# Patient Record
Sex: MALE | Race: BLACK OR AFRICAN AMERICAN | NOT HISPANIC OR LATINO | Employment: FULL TIME | URBAN - METROPOLITAN AREA
[De-identification: names, ages, dates, MRNs, and addresses within clinical notes are randomized per-mention and may not be internally consistent; named-entity substitution may affect disease eponyms.]

---

## 2023-03-06 ENCOUNTER — CLINICAL SUPPORT (OUTPATIENT)
Dept: PHARMACY | Facility: HOSPITAL | Age: 58
End: 2023-03-06

## 2023-03-06 DIAGNOSIS — E11.65 TYPE 2 DIABETES MELLITUS WITH HYPERGLYCEMIA, WITHOUT LONG-TERM CURRENT USE OF INSULIN (MULTI): Primary | ICD-10-CM

## 2023-03-06 DIAGNOSIS — E11.9 TYPE 2 DIABETES MELLITUS WITHOUT COMPLICATION, WITHOUT LONG-TERM CURRENT USE OF INSULIN (MULTI): Primary | ICD-10-CM

## 2023-03-06 DIAGNOSIS — E11.65 TYPE 2 DIABETES MELLITUS WITH HYPERGLYCEMIA, WITHOUT LONG-TERM CURRENT USE OF INSULIN (MULTI): ICD-10-CM

## 2023-03-06 RX ORDER — METFORMIN HYDROCHLORIDE 500 MG/1
1000 TABLET ORAL DAILY
COMMUNITY
Start: 2019-09-24 | End: 2023-07-25 | Stop reason: SDUPTHER

## 2023-03-06 RX ORDER — ROSUVASTATIN CALCIUM 20 MG/1
20 TABLET, COATED ORAL DAILY
COMMUNITY
Start: 2019-09-24 | End: 2023-04-17 | Stop reason: SINTOL

## 2023-03-06 RX ORDER — ASPIRIN 81 MG/1
81 TABLET ORAL DAILY
COMMUNITY
Start: 2019-09-24

## 2023-03-06 RX ORDER — BLOOD-GLUCOSE METER
1 KIT MISCELLANEOUS DAILY
COMMUNITY
Start: 2019-09-24 | End: 2023-05-02

## 2023-03-06 ASSESSMENT — ENCOUNTER SYMPTOMS: DIABETIC ASSOCIATED SYMPTOMS: 0

## 2023-03-06 NOTE — PROGRESS NOTES
Iraj Benavides is a 57 y.o. male was referred to Clinical Pharmacy Team for his Diabetes management follow up.    Referring Provider: Marian Rios MD    Subjective   Allergies   Allergen Reactions    Penicillins Unknown       [facility]  [address]  [facility] [address]  Phone: [phone] Fax: [phone]      Diabetes  He presents for his follow-up diabetic visit. He has type 2 diabetes mellitus. His disease course has been stable. There are no hypoglycemic associated symptoms. There are no diabetic associated symptoms. There are no hypoglycemic complications. Current diabetic treatment includes oral agent (monotherapy). His home blood glucose trend is increasing steadily. His breakfast blood glucose range is generally 140-180 mg/dl. An ACE inhibitor/angiotensin II receptor blocker is not being taken.       Review of Systems    Objective     There were no vitals taken for this visit.     LAB  Lab Results   Component Value Date    BILITOT 0.6 09/02/2022    CALCIUM 10.1 09/02/2022    CO2 27 09/02/2022    CL 99 09/02/2022    CREATININE 1.14 09/02/2022    GLUCOSE 303 (H) 09/02/2022    ALKPHOS 57 09/02/2022    K 4.4 09/02/2022    PROT 7.3 09/02/2022     09/02/2022    AST 14 09/02/2022    ALT 16 09/02/2022    BUN 18 09/02/2022    ANIONGAP 15 09/02/2022    ALBUMIN 4.4 09/02/2022     Lab Results   Component Value Date    TRIG 132 09/02/2022    CHOL 267 (H) 09/02/2022    HDL 43.6 09/02/2022     Lab Results   Component Value Date    HGBA1C 8.9 (A) 09/02/2022       Current Outpatient Medications on File Prior to Visit   Medication Sig Dispense Refill    aspirin 81 mg EC tablet Take 1 tablet (81 mg) by mouth once daily.      FreeStyle Lite Strips strip 1 strip once daily.      metFORMIN (Glucophage) 500 mg tablet Take 2 tablets (1,000 mg) by mouth once daily.      rosuvastatin (Crestor) 20 mg tablet Take 1 tablet (20 mg) by mouth once daily.       No current facility-administered medications on file  prior to visit.        HISTORICAL PHARMACOTHERAPY  - Farxiga 5 mg    DRUG INTERACTIONS  - None    SECONDARY PREVENTION  - Statin? yes  - ACE-I/ARB? no    Assessment/Plan   Problem List Items Addressed This Visit          Endocrine/Metabolic    Type 2 diabetes mellitus without complications (CMS/HCC) - Primary      Iraj has experienced a UTI while taking Farxiga 5 mg and reported that he has stopped taking this medication. He is interested in starting another medication for his diabetes and prefers to take medications by mouth. Rybelsus was discussed and Iraj is agreeable in starting this medication.    Rybelsus Education:     - Counseled patient on Rybelsus MOA, expectations, side effects, duration of therapy, and monitoring parameters.  - Reviewed Rybelsus titration schedule, starting with 3 mg once daily for 4 weeks, then increasing to 7 mg once daily. Patient verbalized understanding.  - Counseled patient on the benefits of GLP-1ra, such as cardiovascular risk reduction, glycemic control, and weight loss potential.  - Advised patient that they may experience improved satiety after meals and portion sizes of meals may be reduced as doses of Rybelsus increase.  - Counseled patient to avoid foods that are fatty/oily as this may precipitate the nausea/GI upset that may occur with new start Rybelsus.      Seema Johnson, PharmJIMI     Verbal consent to manage patient's drug therapy was obtained from the patient. They were informed they may decline to participate or withdraw from participation in pharmacy services at any time.

## 2023-03-13 DIAGNOSIS — E11.9 TYPE 2 DIABETES MELLITUS WITHOUT COMPLICATION, WITHOUT LONG-TERM CURRENT USE OF INSULIN (MULTI): Primary | ICD-10-CM

## 2023-03-14 PROBLEM — H52.13 MYOPIA OF BOTH EYES WITH REGULAR ASTIGMATISM: Status: ACTIVE | Noted: 2023-03-14

## 2023-03-14 PROBLEM — M79.10 MYALGIA: Status: ACTIVE | Noted: 2023-03-14

## 2023-03-14 PROBLEM — E66.9 OBESITY, CLASS I, BMI 30.0-34.9 (SEE ACTUAL BMI): Status: ACTIVE | Noted: 2023-03-14

## 2023-03-14 PROBLEM — H52.13 MYOPIA WITH PRESBYOPIA OF BOTH EYES: Status: ACTIVE | Noted: 2023-03-14

## 2023-03-14 PROBLEM — H52.13 BILATERAL MYOPIA: Status: ACTIVE | Noted: 2023-03-14

## 2023-03-14 PROBLEM — E78.5 HYPERLIPIDEMIA: Status: ACTIVE | Noted: 2023-03-14

## 2023-03-14 PROBLEM — H52.4 MYOPIA WITH PRESBYOPIA OF BOTH EYES: Status: ACTIVE | Noted: 2023-03-14

## 2023-03-14 PROBLEM — E66.811 OBESITY, CLASS I, BMI 30.0-34.9 (SEE ACTUAL BMI): Status: ACTIVE | Noted: 2023-03-14

## 2023-03-14 PROBLEM — D57.3 SICKLE CELL TRAIT (CMS-HCC): Status: ACTIVE | Noted: 2023-03-14

## 2023-03-14 PROBLEM — H52.223 MYOPIA OF BOTH EYES WITH REGULAR ASTIGMATISM: Status: ACTIVE | Noted: 2023-03-14

## 2023-03-14 RX ORDER — DAPAGLIFLOZIN 5 MG/1
5 TABLET, FILM COATED ORAL DAILY
COMMUNITY
End: 2023-03-20 | Stop reason: SINTOL

## 2023-03-20 ENCOUNTER — APPOINTMENT (OUTPATIENT)
Dept: PHARMACY | Facility: HOSPITAL | Age: 58
End: 2023-03-20

## 2023-03-20 ENCOUNTER — TELEMEDICINE (OUTPATIENT)
Dept: PHARMACY | Facility: HOSPITAL | Age: 58
End: 2023-03-20

## 2023-03-20 DIAGNOSIS — E11.9 TYPE 2 DIABETES MELLITUS WITHOUT COMPLICATION, WITHOUT LONG-TERM CURRENT USE OF INSULIN (MULTI): Primary | ICD-10-CM

## 2023-03-20 RX ORDER — ORAL SEMAGLUTIDE 7 MG/1
7 TABLET ORAL DAILY
Qty: 30 TABLET | Refills: 1 | Status: SHIPPED | OUTPATIENT
Start: 2023-04-03 | End: 2023-04-17 | Stop reason: SINTOL

## 2023-03-20 NOTE — PROGRESS NOTES
Subjective   Patient ID: Iraj Benavides is a 57 y.o. male who presents for diabetes management follow up    Referring Provider: Marian Rios MD     Diabetes  He presents for his follow-up diabetic visit. He has type 2 diabetes mellitus. His disease course has been improving. He is compliant with treatment all of the time. His home blood glucose trend is decreasing steadily. His breakfast blood glucose is taken between 8-9 am. His breakfast blood glucose range is generally 130-140 mg/dl. An ACE inhibitor/angiotensin II receptor blocker is not being taken.       Review of Systems   All other systems reviewed and are negative.      Objective     There were no vitals taken for this visit.     Labs  Lab Results   Component Value Date    BILITOT 0.6 09/02/2022    CALCIUM 10.1 09/02/2022    CO2 27 09/02/2022    CL 99 09/02/2022    CREATININE 1.14 09/02/2022    GLUCOSE 303 (H) 09/02/2022    ALKPHOS 57 09/02/2022    K 4.4 09/02/2022    PROT 7.3 09/02/2022     09/02/2022    AST 14 09/02/2022    ALT 16 09/02/2022    BUN 18 09/02/2022    ANIONGAP 15 09/02/2022    ALBUMIN 4.4 09/02/2022    GFRMALE 75 09/02/2022     Lab Results   Component Value Date    TRIG 132 09/02/2022    CHOL 267 (H) 09/02/2022    HDL 43.6 09/02/2022     Lab Results   Component Value Date    HGBA1C 8.9 (A) 09/02/2022       Assessment/Plan        Iraj Benavides states that he is doing well and reports that his fasting blood sugar readings have improved from 140-180 mg/dL to 130-140 mg/dL. He started taking Rybelsus 3 mg by mouth once daily approximately 2 weeks ago and reports no adverse effects. Discussed increasing the dose from 3 mg to 7 mg in 2-3 weeks to further improve glycemic control. Iraj is agreeable with this plan. Plan to follow up in 3-4 weeks to review blood sugar readings on increased dose of Rybelsus.

## 2023-03-20 NOTE — PROGRESS NOTES
I reviewed the progress note and agree with the resident’s findings and plans as written. Case discussed with resident.    Marla Martinez, JimD

## 2023-03-24 LAB
ALANINE AMINOTRANSFERASE (SGPT) (U/L) IN SER/PLAS: 14 U/L (ref 10–52)
ALBUMIN (G/DL) IN SER/PLAS: 4.5 G/DL (ref 3.4–5)
ALBUMIN (MG/L) IN URINE: <7 MG/L
ALBUMIN/CREATININE (UG/MG) IN URINE: NORMAL UG/MG CRT (ref 0–30)
ALKALINE PHOSPHATASE (U/L) IN SER/PLAS: 50 U/L (ref 33–120)
ANION GAP IN SER/PLAS: 12 MMOL/L (ref 10–20)
ASPARTATE AMINOTRANSFERASE (SGOT) (U/L) IN SER/PLAS: 14 U/L (ref 9–39)
BASOPHILS (10*3/UL) IN BLOOD BY AUTOMATED COUNT: 0.04 X10E9/L (ref 0–0.1)
BASOPHILS/100 LEUKOCYTES IN BLOOD BY AUTOMATED COUNT: 0.6 % (ref 0–2)
BILIRUBIN TOTAL (MG/DL) IN SER/PLAS: 0.6 MG/DL (ref 0–1.2)
CALCIUM (MG/DL) IN SER/PLAS: 10 MG/DL (ref 8.6–10.6)
CARBON DIOXIDE, TOTAL (MMOL/L) IN SER/PLAS: 29 MMOL/L (ref 21–32)
CHLORIDE (MMOL/L) IN SER/PLAS: 102 MMOL/L (ref 98–107)
CHOLESTEROL (MG/DL) IN SER/PLAS: 206 MG/DL (ref 0–199)
CHOLESTEROL IN HDL (MG/DL) IN SER/PLAS: 43.9 MG/DL
CHOLESTEROL/HDL RATIO: 4.7
CREATININE (MG/DL) IN SER/PLAS: 1.08 MG/DL (ref 0.5–1.3)
CREATININE (MG/DL) IN URINE: 229 MG/DL (ref 20–370)
EOSINOPHILS (10*3/UL) IN BLOOD BY AUTOMATED COUNT: 0.17 X10E9/L (ref 0–0.7)
EOSINOPHILS/100 LEUKOCYTES IN BLOOD BY AUTOMATED COUNT: 2.6 % (ref 0–6)
ERYTHROCYTE DISTRIBUTION WIDTH (RATIO) BY AUTOMATED COUNT: 12.7 % (ref 11.5–14.5)
ERYTHROCYTE MEAN CORPUSCULAR HEMOGLOBIN CONCENTRATION (G/DL) BY AUTOMATED: 30.9 G/DL (ref 32–36)
ERYTHROCYTE MEAN CORPUSCULAR VOLUME (FL) BY AUTOMATED COUNT: 87 FL (ref 80–100)
ERYTHROCYTES (10*6/UL) IN BLOOD BY AUTOMATED COUNT: 5.42 X10E12/L (ref 4.5–5.9)
ESTIMATED AVERAGE GLUCOSE FOR HBA1C: 131 MG/DL
GFR MALE: 79 ML/MIN/1.73M2
GLUCOSE (MG/DL) IN SER/PLAS: 155 MG/DL (ref 74–99)
HEMATOCRIT (%) IN BLOOD BY AUTOMATED COUNT: 46.9 % (ref 41–52)
HEMOGLOBIN (G/DL) IN BLOOD: 14.5 G/DL (ref 13.5–17.5)
HEMOGLOBIN A1C/HEMOGLOBIN TOTAL IN BLOOD: 6.2 %
IMMATURE GRANULOCYTES/100 LEUKOCYTES IN BLOOD BY AUTOMATED COUNT: 0.5 % (ref 0–0.9)
LDL: 134 MG/DL (ref 0–99)
LEUKOCYTES (10*3/UL) IN BLOOD BY AUTOMATED COUNT: 6.6 X10E9/L (ref 4.4–11.3)
LYMPHOCYTES (10*3/UL) IN BLOOD BY AUTOMATED COUNT: 3.01 X10E9/L (ref 1.2–4.8)
LYMPHOCYTES/100 LEUKOCYTES IN BLOOD BY AUTOMATED COUNT: 45.4 % (ref 13–44)
MONOCYTES (10*3/UL) IN BLOOD BY AUTOMATED COUNT: 0.39 X10E9/L (ref 0.1–1)
MONOCYTES/100 LEUKOCYTES IN BLOOD BY AUTOMATED COUNT: 5.9 % (ref 2–10)
NEUTROPHILS (10*3/UL) IN BLOOD BY AUTOMATED COUNT: 2.99 X10E9/L (ref 1.2–7.7)
NEUTROPHILS/100 LEUKOCYTES IN BLOOD BY AUTOMATED COUNT: 45 % (ref 40–80)
NRBC (PER 100 WBCS) BY AUTOMATED COUNT: 0 /100 WBC (ref 0–0)
PLATELETS (10*3/UL) IN BLOOD AUTOMATED COUNT: 265 X10E9/L (ref 150–450)
POTASSIUM (MMOL/L) IN SER/PLAS: 4.2 MMOL/L (ref 3.5–5.3)
PROTEIN TOTAL: 7.1 G/DL (ref 6.4–8.2)
SODIUM (MMOL/L) IN SER/PLAS: 139 MMOL/L (ref 136–145)
TRIGLYCERIDE (MG/DL) IN SER/PLAS: 142 MG/DL (ref 0–149)
UREA NITROGEN (MG/DL) IN SER/PLAS: 14 MG/DL (ref 6–23)
VLDL: 28 MG/DL (ref 0–40)

## 2023-04-10 ENCOUNTER — APPOINTMENT (OUTPATIENT)
Dept: PRIMARY CARE | Facility: CLINIC | Age: 58
End: 2023-04-10

## 2023-04-17 ENCOUNTER — TELEMEDICINE (OUTPATIENT)
Dept: PHARMACY | Facility: HOSPITAL | Age: 58
End: 2023-04-17

## 2023-04-17 DIAGNOSIS — E11.9 TYPE 2 DIABETES MELLITUS WITHOUT COMPLICATION, WITHOUT LONG-TERM CURRENT USE OF INSULIN (MULTI): Primary | ICD-10-CM

## 2023-04-17 NOTE — PROGRESS NOTES
Subjective   Patient ID: Iraj Benavides is a 57 y.o. male who presents for No chief complaint on file..  Diabetes  He presents for his follow-up diabetic visit. He has type 2 diabetes mellitus. There are no hypoglycemic associated symptoms. He is following a generally healthy diet. (Fasting SMBG 130s-150s) An ACE inhibitor/angiotensin II receptor blocker is not being taken. He does not see a podiatrist.Eye exam is current.     Patient reports he had issues with right-sided thigh muscle cramps and stopped the Rybelsus and rosuvastatin. The cramps are improving but still occurring. Patient does not think he has been on other statins besides a lower dose rosuvastatin in the past.    Patient reports stomach cramping while on Rybelsus. Does not report nausea or vomiting.    Referring Provider: Marian Rios MD     Objective     There were no vitals taken for this visit.     LAB  Lab Results   Component Value Date    BILITOT 0.6 03/24/2023    CALCIUM 10.0 03/24/2023    CO2 29 03/24/2023     03/24/2023    CREATININE 1.08 03/24/2023    GLUCOSE 155 (H) 03/24/2023    ALKPHOS 50 03/24/2023    K 4.2 03/24/2023    PROT 7.1 03/24/2023     03/24/2023    AST 14 03/24/2023    ALT 14 03/24/2023    BUN 14 03/24/2023    ANIONGAP 12 03/24/2023    ALBUMIN 4.5 03/24/2023    GFRMALE 79 03/24/2023     Lab Results   Component Value Date    TRIG 142 03/24/2023    CHOL 206 (H) 03/24/2023    HDL 43.9 03/24/2023     Lab Results   Component Value Date    HGBA1C 6.2 (A) 03/24/2023     Discussion:  -Rybelsus has more GI side effects than muscle effects. It can be hard on the stomach with cramping, nausea/vomiting, feeling full quickly, and changing BM patterns.   -Rosuvastatin can have effects on the muscles. Usually issues occur on both sides of the body, but the muscle effect are typically on large-groups like the quadriceps.   -Your A1c was dramatically reduced from September to March which is fantastic. Keeping up the good work  with your lifestyle will go a long way in helping you to maintain this progress.     Assessment/Plan   Problem List Items Addressed This Visit          Endocrine/Metabolic    Type 2 diabetes mellitus without complications (CMS/HCC) - Primary     Stop rosuvastatin. Can reconsider again at another time  Restart Rybelsus. Start at 3mg for 4 weeks (Call Milana for refill)  Continue great work with improving diet and exercise regimen          Clinical pharmacy follow-up: 5/8/23 at 4pm to assess Rybelsus tolerance    Meera FernandezD Count includes the [facility] Meds PGY1 Resident    Verbal consent to manage patient's drug therapy was obtained from the patient. They were informed they may decline to participate or withdraw from participation in pharmacy services at any time.

## 2023-04-17 NOTE — ASSESSMENT & PLAN NOTE
Stop rosuvastatin. Can reconsider again at another time  Restart Rybelsus. Start at 3mg for 4 weeks (Call [facility] for refill)  Continue great work with improving diet and exercise regimen

## 2023-05-02 DIAGNOSIS — E11.9 TYPE 2 DIABETES MELLITUS WITHOUT COMPLICATION, WITHOUT LONG-TERM CURRENT USE OF INSULIN (MULTI): Primary | ICD-10-CM

## 2023-05-02 RX ORDER — LANCETS 28 GAUGE
EACH MISCELLANEOUS
Qty: 100 EACH | Refills: 3 | Status: SHIPPED | OUTPATIENT
Start: 2023-05-02 | End: 2024-06-10 | Stop reason: SDUPTHER

## 2023-05-02 RX ORDER — BLOOD-GLUCOSE METER
KIT MISCELLANEOUS
Qty: 100 STRIP | Refills: 3 | Status: SHIPPED | OUTPATIENT
Start: 2023-05-02 | End: 2024-06-10 | Stop reason: SDUPTHER

## 2023-05-08 ENCOUNTER — APPOINTMENT (OUTPATIENT)
Dept: PHARMACY | Facility: HOSPITAL | Age: 58
End: 2023-05-08

## 2023-05-25 ENCOUNTER — TELEMEDICINE (OUTPATIENT)
Dept: PHARMACY | Facility: HOSPITAL | Age: 58
End: 2023-05-25

## 2023-05-25 DIAGNOSIS — E11.9 TYPE 2 DIABETES MELLITUS WITHOUT COMPLICATION, WITHOUT LONG-TERM CURRENT USE OF INSULIN (MULTI): ICD-10-CM

## 2023-05-25 ASSESSMENT — ENCOUNTER SYMPTOMS: DIABETIC ASSOCIATED SYMPTOMS: 0

## 2023-05-25 NOTE — PROGRESS NOTES
[facility] Pharmacy Consult  Iraj Benavides is a 57 y.o. male was referred to Clinical Pharmacy Team for a Pharmacy consult.  The patient was referred for their Diabetes.    Referring Provider: Marian Rios MD    Subjective   Allergies   Allergen Reactions    Penicillins Unknown       [facility]  [address]  [facility] [address]  Phone: [phone] Fax: [phone]    [facility] - [place], [place] - [address]  [address]  [address]  [facility] [address]  Phone: [phone] Fax: [phone]    [facility] - [place], [place] - [address]  [address] [facility] [address]  Phone: [phone] Fax: [phone]      Diabetes  He has type 2 diabetes mellitus. His disease course has been stable. There are no hypoglycemic associated symptoms. There are no diabetic associated symptoms. There are no hypoglycemic complications. Symptoms are stable. There are no diabetic complications. Risk factors for coronary artery disease include diabetes mellitus. He is compliant with treatment all of the time. His breakfast blood glucose is taken between 7-8 am. His breakfast blood glucose range is generally 140-180 mg/dl.     Pt reported the following glucose trends:  5/25 - 165 mg/dL  5/24 - 180 mg/dL  5/23 - 155 mg/dL  5/22 - 170 mg/dL  Average: 167.5 mg/dL    Reported no side effects or intolerance of Rybelsus. Reported he is still taking the 7mg dose and was not aware he was to go back down to 3mg from last visit. Back pain still lingering but not bothersome.    Review of Systems    Objective     There were no vitals taken for this visit.     LAB  Lab Results   Component Value Date    BILITOT 0.6 03/24/2023    CALCIUM 10.0 03/24/2023    CO2 29 03/24/2023     03/24/2023    CREATININE 1.08 03/24/2023    GLUCOSE 155 (H) 03/24/2023    ALKPHOS 50 03/24/2023    K 4.2 03/24/2023    PROT 7.1 03/24/2023     03/24/2023    AST 14 03/24/2023    ALT 14 03/24/2023     BUN 14 03/24/2023    ANIONGAP 12 03/24/2023    ALBUMIN 4.5 03/24/2023    GFRMALE 79 03/24/2023     Lab Results   Component Value Date    TRIG 142 03/24/2023    CHOL 206 (H) 03/24/2023    HDL 43.9 03/24/2023     Lab Results   Component Value Date    HGBA1C 6.2 (A) 03/24/2023       Current Outpatient Medications on File Prior to Visit   Medication Sig Dispense Refill    aspirin 81 mg EC tablet Take 1 tablet (81 mg) by mouth once daily.      FreeStyle Lancets 28 gauge CHECK BLOOD SUGAR ONCE DAILY 100 each 3    FreeStyle Lite Strips strip TEST ONCE DAILY 100 strip 3    metFORMIN (Glucophage) 500 mg tablet Take 2 tablets (1,000 mg) by mouth once daily.      semaglutide (Rybelsus) 3 mg tablet tablet Take 1 tablet (3 mg) by mouth once daily. 30 tablet 1     No current facility-administered medications on file prior to visit.        Assessment/Plan   Problem List Items Addressed This Visit          Endocrine/Metabolic    Type 2 diabetes mellitus without complications (CMS/HCC)     Continue Rybelsus 7mg for 4 more weeks then reassess to transition to 14 mg.  Prescription for Rybelsus 7mg daily sent to patient's preferred  pharmacy  Continue metformin  Continue to take down his blood sugar  FUV in 4 weeks             Continue all meds under the continuation of care with the referring provider and clinical pharmacy team.    Cristobal Perdue PharmD     Verbal consent to manage patient's drug therapy was obtained from [the patient and/or an individual authorized to act on behalf of a patient]. They were informed they may decline to participate or withdraw from participation in pharmacy services at any time.

## 2023-05-25 NOTE — ASSESSMENT & PLAN NOTE
Continue Rybelsus 7mg for 4 more weeks then reassess to transition to 14 mg.  Prescription for Rybelsus 7mg daily sent to patient's preferred  pharmacy  Continue metformin  Continue to take down his blood sugar  FUV in 4 weeks

## 2023-05-26 RX ORDER — ORAL SEMAGLUTIDE 7 MG/1
7 TABLET ORAL DAILY
Qty: 30 TABLET | Refills: 3 | Status: SHIPPED | OUTPATIENT
Start: 2023-05-26 | End: 2023-06-23 | Stop reason: DRUGHIGH

## 2023-06-22 ENCOUNTER — APPOINTMENT (OUTPATIENT)
Dept: PHARMACY | Facility: HOSPITAL | Age: 58
End: 2023-06-22

## 2023-06-23 ENCOUNTER — TELEMEDICINE (OUTPATIENT)
Dept: PHARMACY | Facility: HOSPITAL | Age: 58
End: 2023-06-23

## 2023-06-23 DIAGNOSIS — E11.9 TYPE 2 DIABETES MELLITUS WITHOUT COMPLICATION, WITHOUT LONG-TERM CURRENT USE OF INSULIN (MULTI): Primary | ICD-10-CM

## 2023-06-23 ASSESSMENT — ENCOUNTER SYMPTOMS: DIABETIC ASSOCIATED SYMPTOMS: 0

## 2023-06-23 NOTE — ASSESSMENT & PLAN NOTE
FBG remains elevated. No side effects. Agreeable to increase.     INCREASE to Rybelsus 14 mg - 1 tablet daily   CONTINUE:   Metformin 500 mg - 2 tablets daily

## 2023-06-23 NOTE — PROGRESS NOTES
[facility] Pharmacy Consult  Iraj Benavides is a 57 y.o. male was referred to Clinical Pharmacy Team for a Pharmacy consult.  The patient was referred for their Diabetes.    Referring Provider: Marian Rios MD    Subjective   Allergies   Allergen Reactions    Penicillins Unknown       [facility]  [address]  [facility] [address]  Phone: [phone] Fax: [phone]    [facility] - [place], [place] - [address]  [address]  [address]  [facility] [address]  Phone: [phone] Fax: [phone]    [facility] - [place], [place] - [address] [facility]  [address] [facility] [address]  Phone: [phone] Fax: [phone]      Diabetes  He has type 2 diabetes mellitus. His disease course has been stable. There are no hypoglycemic associated symptoms. There are no diabetic associated symptoms. There are no hypoglycemic complications. Symptoms are stable. There are no diabetic complications. Risk factors for coronary artery disease include diabetes mellitus. He is compliant with treatment all of the time.     Reported no side effects or intolerance of Rybelsus.     Review of Systems    Objective     There were no vitals taken for this visit.     LAB  Lab Results   Component Value Date    BILITOT 0.6 03/24/2023    CALCIUM 10.0 03/24/2023    CO2 29 03/24/2023     03/24/2023    CREATININE 1.08 03/24/2023    GLUCOSE 155 (H) 03/24/2023    ALKPHOS 50 03/24/2023    K 4.2 03/24/2023    PROT 7.1 03/24/2023     03/24/2023    AST 14 03/24/2023    ALT 14 03/24/2023    BUN 14 03/24/2023    ANIONGAP 12 03/24/2023    ALBUMIN 4.5 03/24/2023    GFRMALE 79 03/24/2023     Lab Results   Component Value Date    TRIG 142 03/24/2023    CHOL 206 (H) 03/24/2023    HDL 43.9 03/24/2023     Lab Results   Component Value Date    HGBA1C 6.2 (A) 03/24/2023       Current Outpatient Medications on File Prior to Visit   Medication Sig Dispense Refill    aspirin 81 mg EC tablet Take 1  tablet (81 mg) by mouth once daily.      FreeStyle Lancets 28 gauge CHECK BLOOD SUGAR ONCE DAILY 100 each 3    FreeStyle Lite Strips strip TEST ONCE DAILY 100 strip 3    metFORMIN (Glucophage) 500 mg tablet Take 2 tablets (1,000 mg) by mouth once daily.      [DISCONTINUED] semaglutide (Rybelsus) 7 mg tablet Take 7 mg by mouth once daily. 30 tablet 3     No current facility-administered medications on file prior to visit.        Assessment/Plan   Problem List Items Addressed This Visit       Type 2 diabetes mellitus without complications (CMS/[facility]) - Primary     FBG remains elevated. No side effects. Agreeable to increase.     INCREASE to Rybelsus 14 mg - 1 tablet daily   CONTINUE:   Metformin 500 mg - 2 tablets daily              Follow-up: 4 weeks     Marla Martinez, PharmD     Continue all meds under the continuation of care with the referring provider and clinical pharmacy team.

## 2023-07-25 ENCOUNTER — TELEMEDICINE (OUTPATIENT)
Dept: PHARMACY | Facility: HOSPITAL | Age: 58
End: 2023-07-25
Payer: COMMERCIAL

## 2023-07-25 DIAGNOSIS — E11.9 TYPE 2 DIABETES MELLITUS WITHOUT COMPLICATION, WITHOUT LONG-TERM CURRENT USE OF INSULIN (MULTI): ICD-10-CM

## 2023-07-25 RX ORDER — METFORMIN HYDROCHLORIDE 1000 MG/1
TABLET ORAL
Qty: 45 TABLET | Refills: 3 | Status: SHIPPED | OUTPATIENT
Start: 2023-07-25 | End: 2023-08-17 | Stop reason: SDUPTHER

## 2023-07-25 NOTE — ASSESSMENT & PLAN NOTE
INCREASE to Metformin 1000 mg - 1 tablet in the morning and 1/2 tablet in the evening   CONTINUE:   Rybelsus 14 mg- 1 tablet daily

## 2023-07-25 NOTE — PROGRESS NOTES
Subjective     Patient ID: Iraj Benavides is a 57 y.o. male who presents for Diabetes.    Diabetes  He presents for his follow-up diabetic visit. He has type 2 diabetes mellitus. His disease course has been improving. He is compliant with treatment all of the time. He is following a generally healthy diet. He participates in exercise intermittently. His home blood glucose trend is decreasing steadily.     Reports no side effects with max dose Rybelsus    Allergies   Allergen Reactions    Penicillins Unknown       Objective     Current DM Pharmacotherapy:   Rybelsus 14 mg - 1 tablet once daily   Metformin 500 mg - 2 tablets daily     SECONDARY PREVENTION  - Statin? No  - ACE-I/ARB? No  - Aspirin? Yes    Current monitoring regimen:   Patient is using: glucometer    SMBG Readings: FB-160 mg/dL     Any episodes of hypoglycemia? No  Hypoglycemia awareness? Yes    There were no vitals taken for this visit.    Pertinent PMH Review:  - PMH of Pancreatitis: No  - PMH/FH of Medullary Thyroid Cancer: No  - PMH of Retinopathy: No  - PMH of Urinary Tract Infections: No    Lab Review  Lab Results   Component Value Date    BILITOT 0.6 2023    CALCIUM 10.0 2023    CO2 29 2023     2023    CREATININE 1.08 2023    GLUCOSE 155 (H) 2023    ALKPHOS 50 2023    K 4.2 2023    PROT 7.1 2023     2023    AST 14 2023    ALT 14 2023    BUN 14 2023    ANIONGAP 12 2023    ALBUMIN 4.5 2023    GFRMALE 79 2023     Lab Results   Component Value Date    TRIG 142 2023    CHOL 206 (H) 2023    HDL 43.9 2023     Lab Results   Component Value Date    HGBA1C 6.2 (A) 2023     The ASCVD Risk score (Salena FERNANDEZ, et al., 2019) failed to calculate for the following reasons:    The smoking status is invalid      Assessment/Plan     Problem List Items Addressed This Visit       Type 2 diabetes mellitus without complications  (CMS/[facility])     INCREASE to Metformin 1000 mg - 1 tablet in the morning and 1/2 tablet in the evening   CONTINUE:   Rybelsus 14 mg- 1 tablet daily             Type 2 diabetes mellitus, is at goal. At goal per outdated A1C, home BG out of goal range    Follow up: I recommend diabetes care be 2 weeks.    Marla FernandezD [facility]  Clinical Pharmacist     Continue all meds under the continuation of care with the referring provider and clinical pharmacy team

## 2023-08-17 ENCOUNTER — TELEMEDICINE (OUTPATIENT)
Dept: PHARMACY | Facility: HOSPITAL | Age: 58
End: 2023-08-17
Payer: COMMERCIAL

## 2023-08-17 DIAGNOSIS — E11.9 TYPE 2 DIABETES MELLITUS WITHOUT COMPLICATION, WITHOUT LONG-TERM CURRENT USE OF INSULIN (MULTI): ICD-10-CM

## 2023-08-17 NOTE — ASSESSMENT & PLAN NOTE
CONTINUE:   Rybelsus 14 mg - 1 tablet once daily   Metformin 500 mg - 2 tablets daily   Obtain new labs and schedule PCP F/U

## 2023-08-17 NOTE — PROGRESS NOTES
Subjective     Patient ID: Iraj Benavides is a 57 y.o. male who presents for No chief complaint on file..    Diabetes  He presents for his follow-up diabetic visit. He has type 2 diabetes mellitus. His disease course has been improving. He is compliant with treatment all of the time. He is following a generally healthy diet. He participates in exercise intermittently. His home blood glucose trend is decreasing steadily.     Reports no side effects with max dose Rybelsus    Allergies   Allergen Reactions    Penicillins Unknown       Objective     Current DM Pharmacotherapy:   Rybelsus 14 mg - 1 tablet once daily   Metformin 1000 mg - 1 tablets morning and 0.5 tablet in the evening     SECONDARY PREVENTION  - Statin? No  - ACE-I/ARB? No  - Aspirin? Yes    Current monitoring regimen:   Patient is using: glucometer    SMBG Readings: Reports primary well controlled with any elevated numbers attributed to dietary excursions     Any episodes of hypoglycemia? No  Hypoglycemia awareness? Yes    There were no vitals taken for this visit.    Pertinent PMH Review:  - PMH of Pancreatitis: No  - PMH/FH of Medullary Thyroid Cancer: No  - PMH of Retinopathy: No  - PMH of Urinary Tract Infections: No    Lab Review  Lab Results   Component Value Date    BILITOT 0.6 03/24/2023    CALCIUM 10.0 03/24/2023    CO2 29 03/24/2023     03/24/2023    CREATININE 1.08 03/24/2023    GLUCOSE 155 (H) 03/24/2023    ALKPHOS 50 03/24/2023    K 4.2 03/24/2023    PROT 7.1 03/24/2023     03/24/2023    AST 14 03/24/2023    ALT 14 03/24/2023    BUN 14 03/24/2023    ANIONGAP 12 03/24/2023    ALBUMIN 4.5 03/24/2023    GFRMALE 79 03/24/2023     Lab Results   Component Value Date    TRIG 142 03/24/2023    CHOL 206 (H) 03/24/2023    HDL 43.9 03/24/2023     Lab Results   Component Value Date    HGBA1C 6.2 (A) 03/24/2023     The ASCVD Risk score (Salena FERNANDEZ, et al., 2019) failed to calculate for the following reasons:    The smoking status is  invalid      Assessment/Plan     Problem List Items Addressed This Visit       Type 2 diabetes mellitus without complications (CMS/[facility])       Type 2 diabetes mellitus, is at goal. At goal per outdated A1C, home BG out of goal range    Follow up: I recommend diabetes care be 2 weeks.    Marla Martinez PharmD [facility]  Clinical Pharmacist     Continue all meds under the continuation of care with the referring provider and clinical pharmacy team

## 2023-08-18 RX ORDER — METFORMIN HYDROCHLORIDE 1000 MG/1
TABLET ORAL
Qty: 45 TABLET | Refills: 3 | Status: SHIPPED | OUTPATIENT
Start: 2023-08-18 | End: 2023-09-12 | Stop reason: SDUPTHER

## 2023-09-12 ENCOUNTER — LAB (OUTPATIENT)
Dept: LAB | Facility: LAB | Age: 58
End: 2023-09-12
Payer: COMMERCIAL

## 2023-09-12 ENCOUNTER — OFFICE VISIT (OUTPATIENT)
Dept: PRIMARY CARE | Facility: CLINIC | Age: 58
End: 2023-09-12
Payer: COMMERCIAL

## 2023-09-12 VITALS
DIASTOLIC BLOOD PRESSURE: 76 MMHG | SYSTOLIC BLOOD PRESSURE: 106 MMHG | WEIGHT: 179.3 LBS | TEMPERATURE: 97.6 F | RESPIRATION RATE: 14 BRPM | HEIGHT: 68 IN | OXYGEN SATURATION: 99 % | HEART RATE: 68 BPM | BODY MASS INDEX: 27.17 KG/M2

## 2023-09-12 DIAGNOSIS — Z12.12 SCREENING FOR COLORECTAL CANCER: ICD-10-CM

## 2023-09-12 DIAGNOSIS — E11.9 TYPE 2 DIABETES MELLITUS WITHOUT COMPLICATION, WITHOUT LONG-TERM CURRENT USE OF INSULIN (MULTI): Primary | ICD-10-CM

## 2023-09-12 DIAGNOSIS — Z12.5 PROSTATE CANCER SCREENING: ICD-10-CM

## 2023-09-12 DIAGNOSIS — Z12.11 SCREENING FOR COLORECTAL CANCER: ICD-10-CM

## 2023-09-12 DIAGNOSIS — R00.2 HEART PALPITATIONS: ICD-10-CM

## 2023-09-12 DIAGNOSIS — E11.9 TYPE 2 DIABETES MELLITUS WITHOUT COMPLICATION, WITHOUT LONG-TERM CURRENT USE OF INSULIN (MULTI): ICD-10-CM

## 2023-09-12 DIAGNOSIS — Z23 FLU VACCINE NEED: ICD-10-CM

## 2023-09-12 LAB
ALANINE AMINOTRANSFERASE (SGPT) (U/L) IN SER/PLAS: 20 U/L (ref 10–52)
ALBUMIN (G/DL) IN SER/PLAS: 4.7 G/DL (ref 3.4–5)
ALKALINE PHOSPHATASE (U/L) IN SER/PLAS: 58 U/L (ref 33–120)
ANION GAP IN SER/PLAS: 15 MMOL/L (ref 10–20)
ASPARTATE AMINOTRANSFERASE (SGOT) (U/L) IN SER/PLAS: 20 U/L (ref 9–39)
BILIRUBIN TOTAL (MG/DL) IN SER/PLAS: 0.7 MG/DL (ref 0–1.2)
CALCIUM (MG/DL) IN SER/PLAS: 10 MG/DL (ref 8.6–10.6)
CARBON DIOXIDE, TOTAL (MMOL/L) IN SER/PLAS: 27 MMOL/L (ref 21–32)
CHLORIDE (MMOL/L) IN SER/PLAS: 101 MMOL/L (ref 98–107)
CREATININE (MG/DL) IN SER/PLAS: 0.99 MG/DL (ref 0.5–1.3)
ESTIMATED AVERAGE GLUCOSE FOR HBA1C: 166 MG/DL
GFR MALE: 88 ML/MIN/1.73M2
GLUCOSE (MG/DL) IN SER/PLAS: 175 MG/DL (ref 74–99)
HEMOGLOBIN A1C/HEMOGLOBIN TOTAL IN BLOOD: 7.4 %
POTASSIUM (MMOL/L) IN SER/PLAS: 4.8 MMOL/L (ref 3.5–5.3)
PROSTATE SPECIFIC ANTIGEN,SCREEN: 0.47 NG/ML (ref 0–4)
PROTEIN TOTAL: 7.6 G/DL (ref 6.4–8.2)
SODIUM (MMOL/L) IN SER/PLAS: 138 MMOL/L (ref 136–145)
UREA NITROGEN (MG/DL) IN SER/PLAS: 13 MG/DL (ref 6–23)

## 2023-09-12 PROCEDURE — 1036F TOBACCO NON-USER: CPT | Performed by: STUDENT IN AN ORGANIZED HEALTH CARE EDUCATION/TRAINING PROGRAM

## 2023-09-12 PROCEDURE — 3078F DIAST BP <80 MM HG: CPT | Performed by: STUDENT IN AN ORGANIZED HEALTH CARE EDUCATION/TRAINING PROGRAM

## 2023-09-12 PROCEDURE — 83036 HEMOGLOBIN GLYCOSYLATED A1C: CPT

## 2023-09-12 PROCEDURE — 80053 COMPREHEN METABOLIC PANEL: CPT

## 2023-09-12 PROCEDURE — 90686 IIV4 VACC NO PRSV 0.5 ML IM: CPT | Performed by: STUDENT IN AN ORGANIZED HEALTH CARE EDUCATION/TRAINING PROGRAM

## 2023-09-12 PROCEDURE — 36415 COLL VENOUS BLD VENIPUNCTURE: CPT

## 2023-09-12 PROCEDURE — 99213 OFFICE O/P EST LOW 20 MIN: CPT | Performed by: STUDENT IN AN ORGANIZED HEALTH CARE EDUCATION/TRAINING PROGRAM

## 2023-09-12 PROCEDURE — 3074F SYST BP LT 130 MM HG: CPT | Performed by: STUDENT IN AN ORGANIZED HEALTH CARE EDUCATION/TRAINING PROGRAM

## 2023-09-12 PROCEDURE — 3044F HG A1C LEVEL LT 7.0%: CPT | Performed by: STUDENT IN AN ORGANIZED HEALTH CARE EDUCATION/TRAINING PROGRAM

## 2023-09-12 PROCEDURE — 90471 IMMUNIZATION ADMIN: CPT | Performed by: STUDENT IN AN ORGANIZED HEALTH CARE EDUCATION/TRAINING PROGRAM

## 2023-09-12 PROCEDURE — 84153 ASSAY OF PSA TOTAL: CPT

## 2023-09-12 RX ORDER — METFORMIN HYDROCHLORIDE 1000 MG/1
TABLET ORAL
Qty: 45 TABLET | Refills: 3 | Status: SHIPPED | OUTPATIENT
Start: 2023-09-12 | End: 2023-11-14 | Stop reason: SDUPTHER

## 2023-09-12 RX ORDER — ACETAMINOPHEN 500 MG
TABLET ORAL
Qty: 1 KIT | Refills: 0 | Status: SHIPPED
Start: 2023-09-12 | End: 2023-11-14 | Stop reason: ALTCHOICE

## 2023-09-12 RX ORDER — ROSUVASTATIN CALCIUM 20 MG/1
20 TABLET, COATED ORAL DAILY
COMMUNITY
End: 2024-06-10 | Stop reason: WASHOUT

## 2023-09-12 ASSESSMENT — LIFESTYLE VARIABLES: HOW MANY STANDARD DRINKS CONTAINING ALCOHOL DO YOU HAVE ON A TYPICAL DAY: PATIENT DOES NOT DRINK

## 2023-09-12 ASSESSMENT — PATIENT HEALTH QUESTIONNAIRE - PHQ9
SUM OF ALL RESPONSES TO PHQ9 QUESTIONS 1 AND 2: 0
2. FEELING DOWN, DEPRESSED OR HOPELESS: NOT AT ALL
1. LITTLE INTEREST OR PLEASURE IN DOING THINGS: NOT AT ALL

## 2023-09-12 NOTE — PROGRESS NOTES
Subjective   Patient ID: Iraj Benavides is a pleasant 57 y.o. male who presents for Follow-up (Pt is here for pre-dm fuv.).  HPI  He is here for follow-up.  His last HbA1c 6.2.  Compliant with medications and follows up with our clinical pharmacy.    Has had couple episodes of palpitation that happened few months ago.  Reports that the episode lasted only few seconds while he was resting.  Was not exacerbated with strenuous exercise or activities.  Does not experience any chest pain or shortness of breath associated with the palpitations.  Does not have any prior diagnosis of heart disease.  No family history of heart disease or arrhythmia.    Review of Systems   All other systems reviewed and are negative.      Visit Vitals  /76 (Patient Position: Sitting)   Pulse 68   Temp 36.4 °C (97.6 °F)   Resp 14          Objective   Physical Exam  Constitutional:       General: He is not in acute distress.     Appearance: Normal appearance.   HENT:      Head: Normocephalic and atraumatic.   Eyes:      General: No scleral icterus.     Conjunctiva/sclera: Conjunctivae normal.   Cardiovascular:      Rate and Rhythm: Normal rate and regular rhythm.      Heart sounds: Normal heart sounds.   Pulmonary:      Effort: Pulmonary effort is normal.      Breath sounds: Normal breath sounds. No wheezing.   Abdominal:      General: Bowel sounds are normal. There is no distension.      Palpations: Abdomen is soft.      Tenderness: There is no abdominal tenderness.   Musculoskeletal:      Cervical back: Neck supple.      Right lower leg: No edema.      Left lower leg: No edema.   Lymphadenopathy:      Cervical: No cervical adenopathy.   Skin:     General: Skin is warm and dry.   Neurological:      General: No focal deficit present.      Mental Status: He is alert and oriented to person, place, and time.   Psychiatric:         Mood and Affect: Mood normal.         Behavior: Behavior normal.         Assessment/Plan   Problem List Items  Addressed This Visit       Type 2 diabetes mellitus without complications (CMS/[facility]) - Primary    Relevant Medications    metFORMIN (Glucophage) 1,000 mg tablet     Other Visit Diagnoses       Screening for colorectal cancer        Relevant Orders    Colonoscopy Screening    Flu vaccine need        Prostate cancer screening        Relevant Orders    Prostate Spec.Ag,Screen    Heart palpitations        Relevant Medications    blood pressure monitor kit    Other Relevant Orders    Holter or Event Cardiac Monitor

## 2023-09-12 NOTE — PATIENT INSTRUCTIONS
You are due for pneumonia vaccine and influenza vaccine  Continue with current medications.  Blood work before your next visit.  If blood work or imaging were ordered during your visit, all the nonurgent lab results will be discussed with you at your next office visit.  Please arrive 15 minutes before your appointment.   Return to office for your annual physical exam or as needed

## 2023-09-18 ENCOUNTER — TELEPHONE (OUTPATIENT)
Dept: PRIMARY CARE | Facility: CLINIC | Age: 58
End: 2023-09-18
Payer: COMMERCIAL

## 2023-09-18 NOTE — TELEPHONE ENCOUNTER
Spoke with pt. Dr. Rios advised the patient to take 1 Metformin in the morning and one in the evening. The clinical pharmacist will be reaching out to him.

## 2023-09-19 DIAGNOSIS — E11.9 TYPE 2 DIABETES MELLITUS WITHOUT COMPLICATION, WITHOUT LONG-TERM CURRENT USE OF INSULIN (MULTI): Primary | ICD-10-CM

## 2023-10-19 ENCOUNTER — APPOINTMENT (OUTPATIENT)
Dept: PHARMACY | Facility: HOSPITAL | Age: 58
End: 2023-10-19
Payer: COMMERCIAL

## 2023-10-24 DIAGNOSIS — Z12.11 SPECIAL SCREENING FOR MALIGNANT NEOPLASMS, COLON: ICD-10-CM

## 2023-10-24 RX ORDER — POLYETHYLENE GLYCOL 3350, SODIUM SULFATE ANHYDROUS, SODIUM BICARBONATE, SODIUM CHLORIDE, POTASSIUM CHLORIDE 236; 22.74; 6.74; 5.86; 2.97 G/4L; G/4L; G/4L; G/4L; G/4L
POWDER, FOR SOLUTION ORAL
Qty: 4000 ML | Refills: 0 | Status: SHIPPED
Start: 2023-10-24 | End: 2023-11-14 | Stop reason: ALTCHOICE

## 2023-10-29 ENCOUNTER — APPOINTMENT (OUTPATIENT)
Dept: RADIOLOGY | Facility: HOSPITAL | Age: 58
End: 2023-10-29
Payer: COMMERCIAL

## 2023-10-29 ENCOUNTER — HOSPITAL ENCOUNTER (EMERGENCY)
Facility: HOSPITAL | Age: 58
Discharge: HOME | End: 2023-10-29
Attending: STUDENT IN AN ORGANIZED HEALTH CARE EDUCATION/TRAINING PROGRAM
Payer: COMMERCIAL

## 2023-10-29 VITALS
HEIGHT: 67 IN | TEMPERATURE: 97.7 F | SYSTOLIC BLOOD PRESSURE: 107 MMHG | OXYGEN SATURATION: 99 % | RESPIRATION RATE: 16 BRPM | HEART RATE: 74 BPM | DIASTOLIC BLOOD PRESSURE: 71 MMHG | BODY MASS INDEX: 29.03 KG/M2 | WEIGHT: 185 LBS

## 2023-10-29 DIAGNOSIS — M51.36 DEGENERATIVE DISC DISEASE, LUMBAR: Primary | ICD-10-CM

## 2023-10-29 DIAGNOSIS — M54.50 BILATERAL LOW BACK PAIN WITHOUT SCIATICA, UNSPECIFIED CHRONICITY: ICD-10-CM

## 2023-10-29 LAB
ALBUMIN SERPL BCP-MCNC: 4.6 G/DL (ref 3.4–5)
ALP SERPL-CCNC: 57 U/L (ref 33–120)
ALT SERPL W P-5'-P-CCNC: 28 U/L (ref 10–52)
ANION GAP SERPL CALC-SCNC: 12 MMOL/L (ref 10–20)
APPEARANCE UR: CLEAR
AST SERPL W P-5'-P-CCNC: 18 U/L (ref 9–39)
BASOPHILS # BLD AUTO: 0.04 X10*3/UL (ref 0–0.1)
BASOPHILS NFR BLD AUTO: 0.6 %
BILIRUB SERPL-MCNC: 0.6 MG/DL (ref 0–1.2)
BILIRUB UR STRIP.AUTO-MCNC: NEGATIVE MG/DL
BUN SERPL-MCNC: 15 MG/DL (ref 6–23)
CALCIUM SERPL-MCNC: 10.5 MG/DL (ref 8.6–10.6)
CHLORIDE SERPL-SCNC: 103 MMOL/L (ref 98–107)
CO2 SERPL-SCNC: 28 MMOL/L (ref 21–32)
COLOR UR: YELLOW
CREAT SERPL-MCNC: 1.02 MG/DL (ref 0.5–1.3)
EOSINOPHIL # BLD AUTO: 0.06 X10*3/UL (ref 0–0.7)
EOSINOPHIL NFR BLD AUTO: 0.9 %
ERYTHROCYTE [DISTWIDTH] IN BLOOD BY AUTOMATED COUNT: 11.8 % (ref 11.5–14.5)
GFR SERPL CREATININE-BSD FRML MDRD: 85 ML/MIN/1.73M*2
GLUCOSE SERPL-MCNC: 272 MG/DL (ref 74–99)
GLUCOSE UR STRIP.AUTO-MCNC: ABNORMAL MG/DL
HCT VFR BLD AUTO: 46.6 % (ref 41–52)
HGB BLD-MCNC: 15 G/DL (ref 13.5–17.5)
HOLD SPECIMEN: NORMAL
IMM GRANULOCYTES # BLD AUTO: 0.02 X10*3/UL (ref 0–0.7)
IMM GRANULOCYTES NFR BLD AUTO: 0.3 % (ref 0–0.9)
KETONES UR STRIP.AUTO-MCNC: NEGATIVE MG/DL
LEUKOCYTE ESTERASE UR QL STRIP.AUTO: NEGATIVE
LIPASE SERPL-CCNC: 63 U/L (ref 9–82)
LYMPHOCYTES # BLD AUTO: 2.4 X10*3/UL (ref 1.2–4.8)
LYMPHOCYTES NFR BLD AUTO: 34.9 %
MCH RBC QN AUTO: 26.9 PG (ref 26–34)
MCHC RBC AUTO-ENTMCNC: 32.2 G/DL (ref 32–36)
MCV RBC AUTO: 84 FL (ref 80–100)
MONOCYTES # BLD AUTO: 0.31 X10*3/UL (ref 0.1–1)
MONOCYTES NFR BLD AUTO: 4.5 %
NEUTROPHILS # BLD AUTO: 4.04 X10*3/UL (ref 1.2–7.7)
NEUTROPHILS NFR BLD AUTO: 58.8 %
NITRITE UR QL STRIP.AUTO: NEGATIVE
NRBC BLD-RTO: 0 /100 WBCS (ref 0–0)
PH UR STRIP.AUTO: 5 [PH]
PLATELET # BLD AUTO: 283 X10*3/UL (ref 150–450)
PMV BLD AUTO: 10.8 FL (ref 7.5–11.5)
POTASSIUM SERPL-SCNC: 4.4 MMOL/L (ref 3.5–5.3)
PROT SERPL-MCNC: 7.7 G/DL (ref 6.4–8.2)
PROT UR STRIP.AUTO-MCNC: NEGATIVE MG/DL
RBC # BLD AUTO: 5.57 X10*6/UL (ref 4.5–5.9)
RBC # UR STRIP.AUTO: NEGATIVE /UL
SODIUM SERPL-SCNC: 139 MMOL/L (ref 136–145)
SP GR UR STRIP.AUTO: 1.03
UROBILINOGEN UR STRIP.AUTO-MCNC: <2 MG/DL
WBC # BLD AUTO: 6.9 X10*3/UL (ref 4.4–11.3)

## 2023-10-29 PROCEDURE — 74176 CT ABD & PELVIS W/O CONTRAST: CPT

## 2023-10-29 PROCEDURE — 36415 COLL VENOUS BLD VENIPUNCTURE: CPT | Performed by: NURSE PRACTITIONER

## 2023-10-29 PROCEDURE — 96361 HYDRATE IV INFUSION ADD-ON: CPT

## 2023-10-29 PROCEDURE — 85025 COMPLETE CBC W/AUTO DIFF WBC: CPT | Performed by: NURSE PRACTITIONER

## 2023-10-29 PROCEDURE — 96374 THER/PROPH/DIAG INJ IV PUSH: CPT

## 2023-10-29 PROCEDURE — 81003 URINALYSIS AUTO W/O SCOPE: CPT | Performed by: NURSE PRACTITIONER

## 2023-10-29 PROCEDURE — 99285 EMERGENCY DEPT VISIT HI MDM: CPT | Performed by: NURSE PRACTITIONER

## 2023-10-29 PROCEDURE — 99284 EMERGENCY DEPT VISIT MOD MDM: CPT | Mod: 25 | Performed by: STUDENT IN AN ORGANIZED HEALTH CARE EDUCATION/TRAINING PROGRAM

## 2023-10-29 PROCEDURE — 2500000004 HC RX 250 GENERAL PHARMACY W/ HCPCS (ALT 636 FOR OP/ED): Performed by: NURSE PRACTITIONER

## 2023-10-29 PROCEDURE — 74176 CT ABD & PELVIS W/O CONTRAST: CPT | Performed by: STUDENT IN AN ORGANIZED HEALTH CARE EDUCATION/TRAINING PROGRAM

## 2023-10-29 PROCEDURE — 83690 ASSAY OF LIPASE: CPT | Performed by: NURSE PRACTITIONER

## 2023-10-29 PROCEDURE — 80053 COMPREHEN METABOLIC PANEL: CPT | Performed by: NURSE PRACTITIONER

## 2023-10-29 RX ORDER — NAPROXEN 500 MG/1
500 TABLET ORAL 2 TIMES DAILY PRN
Qty: 20 TABLET | Refills: 0 | Status: SHIPPED | OUTPATIENT
Start: 2023-10-29 | End: 2024-03-01 | Stop reason: WASHOUT

## 2023-10-29 RX ORDER — KETOROLAC TROMETHAMINE 30 MG/ML
30 INJECTION, SOLUTION INTRAMUSCULAR; INTRAVENOUS ONCE
Status: COMPLETED | OUTPATIENT
Start: 2023-10-29 | End: 2023-10-29

## 2023-10-29 RX ADMIN — SODIUM CHLORIDE 1000 ML: 0.9 INJECTION, SOLUTION INTRAVENOUS at 12:30

## 2023-10-29 RX ADMIN — KETOROLAC TROMETHAMINE 30 MG: 30 INJECTION, SOLUTION INTRAMUSCULAR at 12:30

## 2023-10-29 ASSESSMENT — PAIN DESCRIPTION - PAIN TYPE: TYPE: ACUTE PAIN

## 2023-10-29 ASSESSMENT — PAIN DESCRIPTION - DESCRIPTORS: DESCRIPTORS: SHARP

## 2023-10-29 ASSESSMENT — PAIN SCALES - GENERAL: PAINLEVEL_OUTOF10: 8

## 2023-10-29 ASSESSMENT — PAIN - FUNCTIONAL ASSESSMENT: PAIN_FUNCTIONAL_ASSESSMENT: 0-10

## 2023-10-29 NOTE — ED PROVIDER NOTES
HPI   Chief Complaint   Patient presents with    Back Pain     Lower back Pain , start Yesterday. No urinary problem        This is a 56yo male who self reports a PMH of NIDDM presenting to the ER for lower back pain since last night. Pt reports constant, pressure-like pain to his lower back/bilateral flank region without a specific pattern. He denies injury. No fever or chills. No nausea, vomiting, or diarrhea. He mentions last night that he had a dull ache (localizing to his mid abdomen) that he took Tums for (does not take regularly). He has no abdominal pain now. Last BM this morning and normal. No urinary complaints. No history of kidney stones. No self tx PTA. ROS otherwise negative.    PMH/PSH reviewed    General: No apparent distress, answers questions appropriately   HEENT: Normocephalic atraumatic, sclera white. Nose patent bilaterally. Mucous membranes pink and moist.   Neck: Trachea midline. No swelling or tenderness.  Lungs: Clear to auscultation bilaterally. No use of accessory muscles.  Heart: Regular rate and rhythm. No obvious murmur.  Abdomen: Soft, nontender bowel sounds present. No rebound tenderness. No CVA tenderness.  Back: No midline spinal tenderness. No evidence of spinal trauma or infection.  Extremities: No clubbing, cyanosis or edema.   Neuro: No motor/sensory or focal deficits.   Psych: Normal affect. Pt calm and cooperative.  Skin: No acute rash.    ED course: This patient's case was staffed with Dr. Montana who was involved with the decision-making and plan of care. See attending note for further details. At bedside for abdominal US - aorta/vasculature appears normal/unremarkable  Pt hydrated with IV NS and medicated with Toradol for pain.  See lab results and diagnostic reports. WBC normal Glucose 272 urine without ketones or blood  CT:     1. No acute abnormality of the abdomen or pelvis.  2. Moderate multilevel degenerative changes of the lower lumbar  spine, most pronounced at  L5-S1, notable for high-grade bilateral  foraminal stenosis (right > left).  3. Colonic diverticulosis without evidence of diverticulitis.    Pt afebrile, VSS in NAD. On reexam, pain improved after Toradol. S/s felt to be likely musculoskeletal in nature. Low suspicion for acute emergent process today.    At this point, the patient will be discharged from the emergency department. He is in stable condition and in agreement with treatment plan.    A whole food diet (low in processed foods/excess sugar and high in plant fiber) was recommended.     Assessment/Plan:  #1 Lower back pain - naproxen prn     Follow-up with a primary medical doctor in 3-5 days. Return to the emergency department with any new concerns or if condition worsens.  *Please note that portions of this note may have been completed with a voice recognition program.  Efforts were made to edit the dictations but occasionally, words are mis-transcribed.                          No data recorded                Patient History   Past Medical History:   Diagnosis Date    Body mass index (BMI) 29.0-29.9, adult 05/21/2020    Body mass index (BMI) of 29.0 to 29.9 in adult    Personal history of other diseases of the circulatory system 05/20/2021    History of rheumatic fever    Prediabetes     Prediabetes     Past Surgical History:   Procedure Laterality Date    OTHER SURGICAL HISTORY  09/24/2019    Complete colonoscopy     Family History   Problem Relation Name Age of Onset    Coronary artery disease Mother      Hyperlipidemia Mother      Other (malignant neoplasm) Mother      Diabetes type II Mother      Other (malignant neoplasm) Father       Social History     Tobacco Use    Smoking status: Never    Smokeless tobacco: Never   Substance Use Topics    Alcohol use: Never    Drug use: Never       Physical Exam   ED Triage Vitals [10/29/23 1102]   Temp Heart Rate Resp BP   36.5 °C (97.7 °F) 74 16 107/71      SpO2 Temp src Heart Rate Source Patient Position   99  % -- Monitor --      BP Location FiO2 (%)     Right arm --       Physical Exam    ED Course & MDM   Diagnoses as of 10/29/23 1825   Bilateral low back pain without sciatica, unspecified chronicity   Degenerative disc disease, lumbar       Medical Decision Making      Procedure  Procedures    Attestation of Taco Fletcher MD    This patient was seen, evaluated, treated, and dispositioned by the advanced practice provider. I performed an independent history and physical examination, discussed and reviewed pertinent aspects of the case, care, and management with the advanced practice provider, was physically available to the advanced practice provider for questions and consultation at the time the patient was being evaluated in the Emergency Department, and agree with the general content of the advanced practice provider's note, findings, and plan of care.     In summary, the patient is a 57-year-old man with history of early sepsis type 2 diabetes but no other major medical problems and sees doctors regularly and does not use any tobacco regularly who presents with 1 day of atraumatic back pain.  The pain is constant not made worse by anything in think of and there is no ripping or tearing numbness tingling or weakness or radiation down his legs.  No fevers chills IV drug use perianal anesthesia, perineal anesthesia or hematuria dysuria.     PHYSICAL EXAM:  VITAL SIGNS: Nursing notes reviewed.  GENERAL:  Alert and interactive  EYES:   Eyes track. No injection.  ENT:  Airway patent. Mucus membranes moist.  RESPIRATORY:  Nonlabored breathing. Chest rise symmetric  CARDIOVASCULAR:  [Regular rate.] [Regular rhythm.]  GASTROINTESTINAL:  No distension.  MUSCULOSKELETAL:  No deformity.  No swelling.  NEUROLOGICAL:  Awake. Moves extremities  SKIN:  Warm. Dry.    Pain does not seem colicky so we did do a bedside ultrasound to look for signs of enlarged aorta and the aortic caliber was normal which is reassuring especially  given he has no risk factors other than age.  While not classic of kidney stones we are concerned that that was causing his presentation so he sent him for dry CT of the abdomen pelvis.  His abdomen is otherwise benign.  Blood work was obtained.  No chest pain shortness of breath or other symptoms point towards cardiopulmonary pathology.  No midline tenderness to point towards lumbar spine or vertebral pathology and he has no rashes to point towards shingles.  We treated his pain here.  If his work-up is reassuring and I believe he safe for discharge we did discuss the diagnostic uncertainty and recommended he return if his symptoms should worsen in any way and otherwise follow-up with his regular doctor.  Please refer to the advanced practice provider's note for further details of this case.        Ligia Wylie, APRN-CNP  10/29/23 2911

## 2023-10-30 ENCOUNTER — APPOINTMENT (OUTPATIENT)
Dept: GASTROENTEROLOGY | Facility: EXTERNAL LOCATION | Age: 58
End: 2023-10-30
Payer: COMMERCIAL

## 2023-11-07 ENCOUNTER — OFFICE VISIT (OUTPATIENT)
Dept: GASTROENTEROLOGY | Facility: EXTERNAL LOCATION | Age: 58
End: 2023-11-07
Payer: COMMERCIAL

## 2023-11-07 DIAGNOSIS — Z86.010 PERSONAL HISTORY OF COLONIC POLYPS: ICD-10-CM

## 2023-11-07 DIAGNOSIS — Z12.12 SCREENING FOR COLORECTAL CANCER: Primary | ICD-10-CM

## 2023-11-07 DIAGNOSIS — Z12.12 SCREENING FOR COLORECTAL CANCER: ICD-10-CM

## 2023-11-07 DIAGNOSIS — Z12.11 SCREENING FOR COLORECTAL CANCER: Primary | ICD-10-CM

## 2023-11-07 DIAGNOSIS — Z12.11 SCREENING FOR COLORECTAL CANCER: ICD-10-CM

## 2023-11-07 PROCEDURE — G0105 COLORECTAL SCRN; HI RISK IND: HCPCS | Performed by: INTERNAL MEDICINE

## 2023-11-07 PROCEDURE — 1036F TOBACCO NON-USER: CPT | Performed by: INTERNAL MEDICINE

## 2023-11-07 PROCEDURE — 3051F HG A1C>EQUAL 7.0%<8.0%: CPT | Performed by: INTERNAL MEDICINE

## 2023-11-07 PROCEDURE — 3074F SYST BP LT 130 MM HG: CPT | Performed by: INTERNAL MEDICINE

## 2023-11-07 PROCEDURE — 3078F DIAST BP <80 MM HG: CPT | Performed by: INTERNAL MEDICINE

## 2023-11-14 ENCOUNTER — OFFICE VISIT (OUTPATIENT)
Dept: PRIMARY CARE | Facility: CLINIC | Age: 58
End: 2023-11-14
Payer: COMMERCIAL

## 2023-11-14 VITALS
WEIGHT: 182.7 LBS | BODY MASS INDEX: 28.67 KG/M2 | RESPIRATION RATE: 15 BRPM | OXYGEN SATURATION: 95 % | DIASTOLIC BLOOD PRESSURE: 64 MMHG | HEART RATE: 89 BPM | SYSTOLIC BLOOD PRESSURE: 110 MMHG | HEIGHT: 67 IN

## 2023-11-14 DIAGNOSIS — E11.9 TYPE 2 DIABETES MELLITUS WITHOUT COMPLICATION, WITHOUT LONG-TERM CURRENT USE OF INSULIN (MULTI): ICD-10-CM

## 2023-11-14 DIAGNOSIS — Z00.00 ANNUAL PHYSICAL EXAM: Primary | ICD-10-CM

## 2023-11-14 DIAGNOSIS — E78.5 HYPERLIPIDEMIA, UNSPECIFIED HYPERLIPIDEMIA TYPE: ICD-10-CM

## 2023-11-14 DIAGNOSIS — M79.10 MYALGIA: ICD-10-CM

## 2023-11-14 PROCEDURE — 3078F DIAST BP <80 MM HG: CPT | Performed by: STUDENT IN AN ORGANIZED HEALTH CARE EDUCATION/TRAINING PROGRAM

## 2023-11-14 PROCEDURE — 1036F TOBACCO NON-USER: CPT | Performed by: STUDENT IN AN ORGANIZED HEALTH CARE EDUCATION/TRAINING PROGRAM

## 2023-11-14 PROCEDURE — 3074F SYST BP LT 130 MM HG: CPT | Performed by: STUDENT IN AN ORGANIZED HEALTH CARE EDUCATION/TRAINING PROGRAM

## 2023-11-14 PROCEDURE — 99396 PREV VISIT EST AGE 40-64: CPT | Performed by: STUDENT IN AN ORGANIZED HEALTH CARE EDUCATION/TRAINING PROGRAM

## 2023-11-14 PROCEDURE — 3051F HG A1C>EQUAL 7.0%<8.0%: CPT | Performed by: STUDENT IN AN ORGANIZED HEALTH CARE EDUCATION/TRAINING PROGRAM

## 2023-11-14 RX ORDER — METFORMIN HYDROCHLORIDE 1000 MG/1
1000 TABLET ORAL
Qty: 90 TABLET | Refills: 3 | Status: SHIPPED | OUTPATIENT
Start: 2023-11-14 | End: 2024-02-15 | Stop reason: SDUPTHER

## 2023-11-14 RX ORDER — BLOOD-GLUCOSE SENSOR
EACH MISCELLANEOUS
Qty: 4 EACH | Refills: 11 | Status: SHIPPED | OUTPATIENT
Start: 2023-11-14 | End: 2024-03-22 | Stop reason: SDUPTHER

## 2023-11-14 ASSESSMENT — LIFESTYLE VARIABLES
HOW OFTEN DO YOU HAVE A DRINK CONTAINING ALCOHOL: NEVER
HOW MANY STANDARD DRINKS CONTAINING ALCOHOL DO YOU HAVE ON A TYPICAL DAY: PATIENT DOES NOT DRINK
AUDIT-C TOTAL SCORE: 0
SKIP TO QUESTIONS 9-10: 1
HOW OFTEN DO YOU HAVE SIX OR MORE DRINKS ON ONE OCCASION: NEVER

## 2023-11-14 ASSESSMENT — PATIENT HEALTH QUESTIONNAIRE - PHQ9
1. LITTLE INTEREST OR PLEASURE IN DOING THINGS: NOT AT ALL
2. FEELING DOWN, DEPRESSED OR HOPELESS: NOT AT ALL
SUM OF ALL RESPONSES TO PHQ9 QUESTIONS 1 & 2: 0

## 2023-11-14 NOTE — PROGRESS NOTES
Subjective   Patient ID: Iraj Benavides is a pleasant 57 y.o. male who presents for Annual Exam.  HPI      Health Maintenance:  -   Colonoscopy: , repeat in 7 years   -  Prostate Cancer Screenin, normal   - AAA Screening: NA   - Lung Cancer Screening: NA     Immunizations:  - COVID vaccination status:  - Influenza: UTD  - Shingles: UTD   - TDAP: 2019   - Pneumo Vaccine: 2020 prevnar 13     He has been having cramping in his muscles on the days that he takes rosuvastatin.  We discussed about cutting down the dose to 10 mg to see if it improves his symptoms which she is agreeable with.  He also reports that occasionally he has missed his metformin.  His last HbA1c was at 7.4.  Due for repeat blood work.    Review of Systems   All other systems reviewed and are negative.      Visit Vitals  /64   Pulse 89   Resp 15          Objective   Physical Exam  Constitutional:       General: He is not in acute distress.     Appearance: Normal appearance.   HENT:      Head: Normocephalic and atraumatic.   Eyes:      General: No scleral icterus.     Conjunctiva/sclera: Conjunctivae normal.   Cardiovascular:      Rate and Rhythm: Normal rate and regular rhythm.      Heart sounds: Normal heart sounds.   Pulmonary:      Effort: Pulmonary effort is normal.      Breath sounds: Normal breath sounds. No wheezing.   Abdominal:      General: Bowel sounds are normal. There is no distension.      Palpations: Abdomen is soft.      Tenderness: There is no abdominal tenderness.   Musculoskeletal:      Cervical back: Neck supple.      Right lower leg: No edema.      Left lower leg: No edema.   Lymphadenopathy:      Cervical: No cervical adenopathy.   Skin:     General: Skin is warm and dry.   Neurological:      General: No focal deficit present.      Mental Status: He is alert and oriented to person, place, and time.   Psychiatric:         Mood and Affect: Mood normal.         Behavior: Behavior normal.         Assessment/Plan    Problem List Items Addressed This Visit       Type 2 diabetes mellitus without complications (CMS/[facility])     A1c 7.4%. on Metformin          Relevant Medications    metFORMIN (Glucophage) 1,000 mg tablet    blood-glucose sensor (FreeStyle Kori 3 Sensor) device    Other Relevant Orders    Hemoglobin A1C    Lipid Panel    Comprehensive Metabolic Panel    Hyperlipidemia    Relevant Orders    Lipid Panel    Comprehensive Metabolic Panel    Myalgia     Discussed about cutting down on rosuvastatin to 10 mg to see if it improves his symptoms as he has noted the myalgia does not occur on the days that he misses his Crestor.          Other Visit Diagnoses       Annual physical exam    -  Primary

## 2023-11-14 NOTE — PATIENT INSTRUCTIONS
Please let me know if the Crestor 10 mg works better for you.   Continue with current medications.  Blood work before your next visit.  If blood work or imaging were ordered during your visit, all the nonurgent lab results will be discussed with you at your next office visit.  Please arrive 15 minutes before your appointment.   Follow up in 3-4 months for DM check.   Return to office in 12 months for physical or as needed

## 2023-11-14 NOTE — ASSESSMENT & PLAN NOTE
Discussed about cutting down on rosuvastatin to 10 mg to see if it improves his symptoms as he has noted the myalgia does not occur on the days that he misses his Crestor.

## 2024-02-15 ENCOUNTER — LAB (OUTPATIENT)
Dept: LAB | Facility: LAB | Age: 59
End: 2024-02-15
Payer: COMMERCIAL

## 2024-02-15 ENCOUNTER — OFFICE VISIT (OUTPATIENT)
Dept: PRIMARY CARE | Facility: CLINIC | Age: 59
End: 2024-02-15
Payer: COMMERCIAL

## 2024-02-15 VITALS
TEMPERATURE: 97.6 F | OXYGEN SATURATION: 98 % | BODY MASS INDEX: 28.52 KG/M2 | HEIGHT: 67 IN | WEIGHT: 181.7 LBS | SYSTOLIC BLOOD PRESSURE: 102 MMHG | HEART RATE: 72 BPM | DIASTOLIC BLOOD PRESSURE: 60 MMHG | RESPIRATION RATE: 14 BRPM

## 2024-02-15 DIAGNOSIS — E11.9 TYPE 2 DIABETES MELLITUS WITHOUT COMPLICATION, WITHOUT LONG-TERM CURRENT USE OF INSULIN (MULTI): ICD-10-CM

## 2024-02-15 DIAGNOSIS — E78.5 HYPERLIPIDEMIA, UNSPECIFIED HYPERLIPIDEMIA TYPE: ICD-10-CM

## 2024-02-15 LAB
ALBUMIN SERPL BCP-MCNC: 4.6 G/DL (ref 3.4–5)
ALP SERPL-CCNC: 60 U/L (ref 33–120)
ALT SERPL W P-5'-P-CCNC: 17 U/L (ref 10–52)
ANION GAP SERPL CALC-SCNC: 14 MMOL/L (ref 10–20)
AST SERPL W P-5'-P-CCNC: 15 U/L (ref 9–39)
BILIRUB SERPL-MCNC: 0.7 MG/DL (ref 0–1.2)
BUN SERPL-MCNC: 13 MG/DL (ref 6–23)
CALCIUM SERPL-MCNC: 10.3 MG/DL (ref 8.6–10.6)
CHLORIDE SERPL-SCNC: 100 MMOL/L (ref 98–107)
CHOLEST SERPL-MCNC: 165 MG/DL (ref 0–199)
CHOLESTEROL/HDL RATIO: 3.8
CO2 SERPL-SCNC: 28 MMOL/L (ref 21–32)
CREAT SERPL-MCNC: 1 MG/DL (ref 0.5–1.3)
CREAT UR-MCNC: 155.2 MG/DL (ref 20–370)
EGFRCR SERPLBLD CKD-EPI 2021: 87 ML/MIN/1.73M*2
EST. AVERAGE GLUCOSE BLD GHB EST-MCNC: 206 MG/DL
GLUCOSE SERPL-MCNC: 215 MG/DL (ref 74–99)
HBA1C MFR BLD: 8.8 %
HDLC SERPL-MCNC: 43.1 MG/DL
LDLC SERPL CALC-MCNC: 103 MG/DL
MICROALBUMIN UR-MCNC: <7 MG/L
MICROALBUMIN/CREAT UR: NORMAL MG/G{CREAT}
NON HDL CHOLESTEROL: 122 MG/DL (ref 0–149)
POTASSIUM SERPL-SCNC: 4.2 MMOL/L (ref 3.5–5.3)
PROT SERPL-MCNC: 7.3 G/DL (ref 6.4–8.2)
SODIUM SERPL-SCNC: 138 MMOL/L (ref 136–145)
TRIGL SERPL-MCNC: 94 MG/DL (ref 0–149)
VLDL: 19 MG/DL (ref 0–40)

## 2024-02-15 PROCEDURE — 36415 COLL VENOUS BLD VENIPUNCTURE: CPT

## 2024-02-15 PROCEDURE — 80053 COMPREHEN METABOLIC PANEL: CPT

## 2024-02-15 PROCEDURE — 80061 LIPID PANEL: CPT

## 2024-02-15 PROCEDURE — 3078F DIAST BP <80 MM HG: CPT | Performed by: STUDENT IN AN ORGANIZED HEALTH CARE EDUCATION/TRAINING PROGRAM

## 2024-02-15 PROCEDURE — 99213 OFFICE O/P EST LOW 20 MIN: CPT | Performed by: STUDENT IN AN ORGANIZED HEALTH CARE EDUCATION/TRAINING PROGRAM

## 2024-02-15 PROCEDURE — 82570 ASSAY OF URINE CREATININE: CPT

## 2024-02-15 PROCEDURE — 83036 HEMOGLOBIN GLYCOSYLATED A1C: CPT

## 2024-02-15 PROCEDURE — 3074F SYST BP LT 130 MM HG: CPT | Performed by: STUDENT IN AN ORGANIZED HEALTH CARE EDUCATION/TRAINING PROGRAM

## 2024-02-15 PROCEDURE — 1036F TOBACCO NON-USER: CPT | Performed by: STUDENT IN AN ORGANIZED HEALTH CARE EDUCATION/TRAINING PROGRAM

## 2024-02-15 PROCEDURE — 82043 UR ALBUMIN QUANTITATIVE: CPT

## 2024-02-15 RX ORDER — METFORMIN HYDROCHLORIDE 1000 MG/1
1000 TABLET ORAL
Qty: 90 TABLET | Refills: 3 | Status: SHIPPED
Start: 2024-02-15 | End: 2024-03-01 | Stop reason: SINTOL

## 2024-02-15 ASSESSMENT — PATIENT HEALTH QUESTIONNAIRE - PHQ9
2. FEELING DOWN, DEPRESSED OR HOPELESS: NOT AT ALL
1. LITTLE INTEREST OR PLEASURE IN DOING THINGS: NOT AT ALL
SUM OF ALL RESPONSES TO PHQ9 QUESTIONS 1 AND 2: 0

## 2024-02-15 NOTE — PROGRESS NOTES
Subjective   Patient ID: Iraj Benavides is a pleasant 58 y.o. male who presents for Follow-up (Pt is here for DM fuv.).  HPI  Patient is here to follow-up on diabetes, last HbA1c was 7.4 in September 2023.  Due for repeat blood work.  He also needs a refill on metformin. He has been taking metformin 500 mg BID   He had UTI with Rybelsus   He states his gluc has been around 200 in the past month   We discussed about GLP-1 but states he is not good with needles.   He is due for ophthalmology     Review of Systems   All other systems reviewed and are negative.      Visit Vitals  /60 (Patient Position: Sitting)   Pulse 72   Temp 36.4 °C (97.6 °F)   Resp 14          Objective   Physical Exam  Constitutional:       General: He is not in acute distress.     Appearance: Normal appearance.   HENT:      Head: Normocephalic and atraumatic.   Eyes:      General: No scleral icterus.     Conjunctiva/sclera: Conjunctivae normal.   Cardiovascular:      Rate and Rhythm: Normal rate and regular rhythm.      Heart sounds: Normal heart sounds.   Pulmonary:      Effort: Pulmonary effort is normal.      Breath sounds: Normal breath sounds. No wheezing.   Abdominal:      General: Bowel sounds are normal. There is no distension.      Palpations: Abdomen is soft.      Tenderness: There is no abdominal tenderness.   Musculoskeletal:      Cervical back: Neck supple.      Right lower leg: No edema.      Left lower leg: No edema.   Lymphadenopathy:      Cervical: No cervical adenopathy.   Skin:     General: Skin is warm and dry.   Neurological:      General: No focal deficit present.      Mental Status: He is alert and oriented to person, place, and time.   Psychiatric:         Mood and Affect: Mood normal.         Behavior: Behavior normal.         Assessment/Plan   Problem List Items Addressed This Visit       Type 2 diabetes mellitus without complications (CMS/HCC)    Relevant Medications    metFORMIN (Glucophage) 1,000 mg tablet     Other Relevant Orders    Albumin , Urine Random    Referral to Ophthalmology    Follow Up In Advanced Primary Care - Pharmacy

## 2024-02-15 NOTE — PATIENT INSTRUCTIONS
Continue with current medications.  Blood work before your next visit.  If you receive medical information from [facility], your results will be released into your online chart. This means you may view or see results before someone from our office contact you directly.  Please keep in mind that if blood work or imaging were ordered during your visit, all the nonurgent lab results will be discussed with you at your next office visit.  Please arrive 15 minutes before your appointment.   Follow-up with primary care in 4 months or as needed

## 2024-02-27 ENCOUNTER — OFFICE VISIT (OUTPATIENT)
Dept: OPHTHALMOLOGY | Facility: CLINIC | Age: 59
End: 2024-02-27
Payer: COMMERCIAL

## 2024-02-27 DIAGNOSIS — H52.13 MYOPIA WITH PRESBYOPIA OF BOTH EYES: ICD-10-CM

## 2024-02-27 DIAGNOSIS — E11.9 TYPE 2 DIABETES MELLITUS WITHOUT RETINOPATHY (MULTI): Primary | ICD-10-CM

## 2024-02-27 DIAGNOSIS — H52.4 MYOPIA WITH PRESBYOPIA OF BOTH EYES: ICD-10-CM

## 2024-02-27 DIAGNOSIS — E11.9 TYPE 2 DIABETES MELLITUS WITHOUT COMPLICATION, WITHOUT LONG-TERM CURRENT USE OF INSULIN (MULTI): ICD-10-CM

## 2024-02-27 PROCEDURE — 92014 COMPRE OPH EXAM EST PT 1/>: CPT | Performed by: STUDENT IN AN ORGANIZED HEALTH CARE EDUCATION/TRAINING PROGRAM

## 2024-02-27 PROCEDURE — 92015 DETERMINE REFRACTIVE STATE: CPT | Performed by: STUDENT IN AN ORGANIZED HEALTH CARE EDUCATION/TRAINING PROGRAM

## 2024-02-27 ASSESSMENT — EXTERNAL EXAM - RIGHT EYE: OD_EXAM: NORMAL

## 2024-02-27 ASSESSMENT — CONF VISUAL FIELD
OS_SUPERIOR_TEMPORAL_RESTRICTION: 0
OD_NORMAL: 1
OD_INFERIOR_NASAL_RESTRICTION: 0
OS_NORMAL: 1
OS_SUPERIOR_NASAL_RESTRICTION: 0
METHOD: COUNTING FINGERS
OD_SUPERIOR_NASAL_RESTRICTION: 0
OS_INFERIOR_NASAL_RESTRICTION: 0
OS_INFERIOR_TEMPORAL_RESTRICTION: 0
OD_SUPERIOR_TEMPORAL_RESTRICTION: 0
OD_INFERIOR_TEMPORAL_RESTRICTION: 0

## 2024-02-27 ASSESSMENT — ENCOUNTER SYMPTOMS
RESPIRATORY NEGATIVE: 0
ENDOCRINE NEGATIVE: 0
NEUROLOGICAL NEGATIVE: 0
ALLERGIC/IMMUNOLOGIC NEGATIVE: 0
EYES NEGATIVE: 0
GASTROINTESTINAL NEGATIVE: 0
CONSTITUTIONAL NEGATIVE: 0
PSYCHIATRIC NEGATIVE: 0
HEMATOLOGIC/LYMPHATIC NEGATIVE: 0
CARDIOVASCULAR NEGATIVE: 0
MUSCULOSKELETAL NEGATIVE: 0

## 2024-02-27 ASSESSMENT — TONOMETRY
OS_IOP_MMHG: 14
IOP_METHOD: GOLDMANN APPLANATION
OD_IOP_MMHG: 14

## 2024-02-27 ASSESSMENT — SLIT LAMP EXAM - LIDS
COMMENTS: GOOD POSITION
COMMENTS: GOOD POSITION

## 2024-02-27 ASSESSMENT — REFRACTION_MANIFEST
OS_SPHERE: -0.25
OS_ADD: +2.50
OD_AXIS: 011
OD_CYLINDER: -0.50
OS_AXIS: 137
OS_CYLINDER: -0.50
OD_SPHERE: -0.75
METHOD_AUTOREFRACTION: 1
OD_SPHERE: -0.25
OS_AXIS: 137
OD_ADD: +2.50
OS_SPHERE: -0.50
OD_AXIS: 011
OD_ADD: +2.50
OD_CYLINDER: -0.50
OS_CYLINDER: -0.25
OS_ADD: +2.50

## 2024-02-27 ASSESSMENT — VISUAL ACUITY
OD_SC: 20/30
OS_SC: 20/25-2
METHOD: SNELLEN - LINEAR
OD_PH_SC: 20/20-1

## 2024-02-27 ASSESSMENT — CUP TO DISC RATIO
OD_RATIO: .35
OS_RATIO: .4

## 2024-02-27 ASSESSMENT — EXTERNAL EXAM - LEFT EYE: OS_EXAM: NORMAL

## 2024-02-27 NOTE — PROGRESS NOTES
Assessment/Plan   Diagnoses and all orders for this visit:  Type 2 diabetes mellitus without retinopathy (CMS/[facility])  -no retinopathy observed on exam today od/os, pt ed to continue good BGlc, blood pressure and lipid control, rtc with any changes in vision, otherwise monitor 1 year  Myopia with presbyopia of both eyes  -New spec rx released today per patient request. Ocular health wnl for age OU. Monitor 1 year or sooner prn. Refraction billed today.    RTC 1 year for annual with DFE and MRX

## 2024-03-01 ENCOUNTER — TELEMEDICINE (OUTPATIENT)
Dept: PHARMACY | Facility: HOSPITAL | Age: 59
End: 2024-03-01
Payer: COMMERCIAL

## 2024-03-01 DIAGNOSIS — E11.9 TYPE 2 DIABETES MELLITUS WITHOUT COMPLICATION, WITHOUT LONG-TERM CURRENT USE OF INSULIN (MULTI): ICD-10-CM

## 2024-03-01 RX ORDER — DULAGLUTIDE 0.75 MG/.5ML
0.75 INJECTION, SOLUTION SUBCUTANEOUS
Qty: 2 ML | Refills: 1 | Status: SHIPPED | OUTPATIENT
Start: 2024-03-01 | End: 2024-03-28 | Stop reason: SDUPTHER

## 2024-03-01 NOTE — PROGRESS NOTES
Subjective     Patient ID: Iraj Benavides is a 58 y.o. male who presents for Diabetes.    Diabetes  He presents for his follow-up diabetic visit. He has type 2 diabetes mellitus. His disease course has been worsening. He is compliant with treatment some of the time. He is following a generally healthy diet. Exercise: Active job.     Patient had stopped Rybelsus- report he had UTI and felt it was the cause     Patient has stopped his Metformin- believes it is the cause of his back pain (ongoing for 2-3 weeks), lethargy, and sluggishness     Patient had stopped Farxiga - Had UTI while on it     Allergies   Allergen Reactions    Penicillins Unknown       Objective     Current DM Pharmacotherapy:   None    SECONDARY PREVENTION  - Statin? No  - ACE-I/ARB? No  - Aspirin? Yes    Current monitoring regimen:   Patient is using: glucometer    SMBG Readings: Regularly in the 200's FBG - today was 221 mg/dL     Any episodes of hypoglycemia? No  Hypoglycemia awareness? Yes    There were no vitals taken for this visit.    Pertinent PMH Review:  - PMH of Pancreatitis: No  - PMH/FH of Medullary Thyroid Cancer: No  - PMH of Retinopathy: No  - PMH of Urinary Tract Infections: Yes    Lab Review  Lab Results   Component Value Date    BILITOT 0.7 02/15/2024    CALCIUM 10.3 02/15/2024    CO2 28 02/15/2024     02/15/2024    CREATININE 1.00 02/15/2024    GLUCOSE 215 (H) 02/15/2024    ALKPHOS 60 02/15/2024    K 4.2 02/15/2024    PROT 7.3 02/15/2024     02/15/2024    AST 15 02/15/2024    ALT 17 02/15/2024    BUN 13 02/15/2024    ANIONGAP 14 02/15/2024    ALBUMIN 4.6 02/15/2024    LIPASE 63 10/29/2023    GFRMALE 88 09/12/2023     Lab Results   Component Value Date    TRIG 94 02/15/2024    CHOL 165 02/15/2024    LDLCALC 103 (H) 02/15/2024    HDL 43.1 02/15/2024     Lab Results   Component Value Date    HGBA1C 8.8 (H) 02/15/2024     The 10-year ASCVD risk score (Salena FERNANDEZ, et al., 2019) is: 9.7%    Values used to calculate the  score:      Age: 58 years      Sex: Male      Is Non- : Yes      Diabetic: Yes      Tobacco smoker: No      Systolic Blood Pressure: 102 mmHg      Is BP treated: No      HDL Cholesterol: 43.1 mg/dL      Total Cholesterol: 165 mg/dL      Assessment/Plan     Problem List Items Addressed This Visit       Type 2 diabetes mellitus without complication, without long-term current use of insulin (CMS/[facility])     Start Trulicity 0.75 mg/0.5 mL - once weekly     Patient instructed to call if he feels he cannot give first injection at home and we will schedule in person visit to have him give first injection in office.           Discussed need to control BG, discussed goal BG, discussed importance of medication adherence, discussed symptoms of hyperglycemia. Patient is adamant that he feels better when not taking metformin.     Trulicity Education:    - Counseled patient on Trulicity MOA, expectations, side effects, duration of therapy, administration, and monitoring parameters.  - Provided detailed dosing and administration counseling to ensure proper technique.   - Reviewed Trulicity titration schedule, starting with 0.75 mg once weekly to 1.5 mg, 3 mg, and if tolerated 4.5 mg.  - Counseled patient on the benefits of GLP-1ra, such as cardiovascular risk reduction, glycemic control, and weight loss potential.  - Reviewed storage requirements of Trulicity when not in use, and when to administer the medication if a dose is missed.  - Advised patient that they may experience improved satiety after meals and portion sizes of meals may be reduced as doses of Trulicity increase.      Type 2 diabetes mellitus, is not at goal. Goal A1C <7%    Follow up: I recommend diabetes care be 3 weeks.    Marla FernandezD [facility]  Clinical Pharmacist     Continue all meds under the continuation of care with the referring provider and clinical pharmacy team

## 2024-03-01 NOTE — ASSESSMENT & PLAN NOTE
Start Trulicity 0.75 mg/0.5 mL - once weekly     Patient instructed to call if he feels he cannot give first injection at home and we will schedule in person visit to have him give first injection in office.

## 2024-03-22 ENCOUNTER — TELEMEDICINE (OUTPATIENT)
Dept: PHARMACY | Facility: HOSPITAL | Age: 59
End: 2024-03-22
Payer: COMMERCIAL

## 2024-03-22 DIAGNOSIS — E11.9 TYPE 2 DIABETES MELLITUS WITHOUT COMPLICATION, WITHOUT LONG-TERM CURRENT USE OF INSULIN (MULTI): ICD-10-CM

## 2024-03-22 RX ORDER — BLOOD-GLUCOSE SENSOR
EACH MISCELLANEOUS
Qty: 4 EACH | Refills: 11 | Status: SHIPPED | OUTPATIENT
Start: 2024-03-22

## 2024-03-22 NOTE — PROGRESS NOTES
Subjective     Patient ID: Iraj Benavides is a 58 y.o. male who presents for Diabetes.    Diabetes  He presents for his follow-up diabetic visit. He has type 2 diabetes mellitus. His disease course has been worsening. He is compliant with treatment some of the time. He is following a generally healthy diet. Exercise: Active job.     Exercise: exercising more - running- walking - 2-3 days per week    Weight: 177 lbs     States no NOY since starting, will titrate conservatively given past intolerances.      Allergies   Allergen Reactions    Penicillins Unknown       Objective     Current DM Pharmacotherapy:   Trulicity 0.75 mg/0.5 mL - once weekly     Previous DM Pharmacotherapy:   Metformin- believed it was causing his back pain and lethargy  Farxiga - UTI   Rybelsus- UTI and feels this was the cause     SECONDARY PREVENTION  - Statin? No  - ACE-I/ARB? No  - Aspirin? Yes    Current monitoring regimen:   Patient is using: glucometer    SMBG Readings:     168 mg/dL - 2 days ago (FBG)     Any episodes of hypoglycemia? No  Hypoglycemia awareness? Yes    There were no vitals taken for this visit.    Pertinent PMH Review:  - PMH of Pancreatitis: No  - PMH/FH of Medullary Thyroid Cancer: No  - PMH of Retinopathy: No  - PMH of Urinary Tract Infections: Yes    Lab Review  Lab Results   Component Value Date    BILITOT 0.7 02/15/2024    CALCIUM 10.3 02/15/2024    CO2 28 02/15/2024     02/15/2024    CREATININE 1.00 02/15/2024    GLUCOSE 215 (H) 02/15/2024    ALKPHOS 60 02/15/2024    K 4.2 02/15/2024    PROT 7.3 02/15/2024     02/15/2024    AST 15 02/15/2024    ALT 17 02/15/2024    BUN 13 02/15/2024    ANIONGAP 14 02/15/2024    ALBUMIN 4.6 02/15/2024    LIPASE 63 10/29/2023    GFRMALE 88 09/12/2023     Lab Results   Component Value Date    TRIG 94 02/15/2024    CHOL 165 02/15/2024    LDLCALC 103 (H) 02/15/2024    HDL 43.1 02/15/2024     Lab Results   Component Value Date    HGBA1C 8.8 (H) 02/15/2024     The 10-year  ASCVD risk score (Salena FERNANDEZ, et al., 2019) is: 9.7%    Values used to calculate the score:      Age: 58 years      Sex: Male      Is Non- : Yes      Diabetic: Yes      Tobacco smoker: No      Systolic Blood Pressure: 102 mmHg      Is BP treated: No      HDL Cholesterol: 43.1 mg/dL      Total Cholesterol: 165 mg/dL      Assessment/Plan     Problem List Items Addressed This Visit       Type 2 diabetes mellitus without complication, without long-term current use of insulin (CMS/[facility])     Continue Trulicity 0.75 mg/0.5 mL - once weekly             Type 2 diabetes mellitus, is not at goal. Goal A1C <7%    Follow up: I recommend diabetes care be 3 weeks.    Marla Martinez PharmD [facility]  Clinical Pharmacist     Continue all meds under the continuation of care with the referring provider and clinical pharmacy team

## 2024-03-28 DIAGNOSIS — E11.9 TYPE 2 DIABETES MELLITUS WITHOUT COMPLICATION, WITHOUT LONG-TERM CURRENT USE OF INSULIN (MULTI): ICD-10-CM

## 2024-03-28 RX ORDER — DULAGLUTIDE 0.75 MG/.5ML
0.75 INJECTION, SOLUTION SUBCUTANEOUS
Qty: 2 ML | Refills: 2 | Status: SHIPPED
Start: 2024-03-28 | End: 2024-04-29 | Stop reason: SDUPTHER

## 2024-04-29 ENCOUNTER — PHARMACY VISIT (OUTPATIENT)
Dept: PHARMACY | Facility: CLINIC | Age: 59
End: 2024-04-29
Payer: COMMERCIAL

## 2024-04-29 ENCOUNTER — TELEMEDICINE (OUTPATIENT)
Dept: PHARMACY | Facility: HOSPITAL | Age: 59
End: 2024-04-29
Payer: COMMERCIAL

## 2024-04-29 DIAGNOSIS — E11.9 TYPE 2 DIABETES MELLITUS WITHOUT COMPLICATION, WITHOUT LONG-TERM CURRENT USE OF INSULIN (MULTI): ICD-10-CM

## 2024-04-29 PROCEDURE — RXMED WILLOW AMBULATORY MEDICATION CHARGE

## 2024-04-29 RX ORDER — DULAGLUTIDE 0.75 MG/.5ML
0.75 INJECTION, SOLUTION SUBCUTANEOUS
Qty: 2 ML | Refills: 2 | Status: SHIPPED | OUTPATIENT
Start: 2024-04-29 | End: 2024-06-03 | Stop reason: DRUGHIGH

## 2024-04-29 NOTE — ASSESSMENT & PLAN NOTE
Continue Trulicity 0.75 mg/0.5 mL - once weekly     Patient continuing lifestyle efforts, reports home BG doing better. Would like to re-evaluate dose in June.

## 2024-04-29 NOTE — PROGRESS NOTES
Subjective     Patient ID: Iraj Benavides is a 58 y.o. male who presents for Diabetes.    Diabetes  He presents for his follow-up diabetic visit. He has type 2 diabetes mellitus. His disease course has been worsening. He is compliant with treatment some of the time. He is following a generally healthy diet. Exercise: Active job.     Exercise: exercising more - running- walking - 2-3 days per week    Weight: 177 lbs     States no NOY since starting, will titrate conservatively given past intolerances.      Allergies   Allergen Reactions    Penicillins Unknown       Objective     Current DM Pharmacotherapy:   Trulicity 0.75 mg/0.5 mL - once weekly     Previous DM Pharmacotherapy:   Metformin- believed it was causing his back pain and lethargy  Farxiga - UTI   Rybelsus- UTI and feels this was the cause     SECONDARY PREVENTION  - Statin? No  - ACE-I/ARB? No  - Aspirin? Yes    Current monitoring regimen:   Patient is using: glucometer    SMBG Readings:     160 mg/dL - 2 days ago (FBG)     Any episodes of hypoglycemia? No  Hypoglycemia awareness? Yes    There were no vitals taken for this visit.    Pertinent PMH Review:  - PMH of Pancreatitis: No  - PMH/FH of Medullary Thyroid Cancer: No  - PMH of Retinopathy: No  - PMH of Urinary Tract Infections: Yes    Lab Review  Lab Results   Component Value Date    BILITOT 0.7 02/15/2024    CALCIUM 10.3 02/15/2024    CO2 28 02/15/2024     02/15/2024    CREATININE 1.00 02/15/2024    GLUCOSE 215 (H) 02/15/2024    ALKPHOS 60 02/15/2024    K 4.2 02/15/2024    PROT 7.3 02/15/2024     02/15/2024    AST 15 02/15/2024    ALT 17 02/15/2024    BUN 13 02/15/2024    ANIONGAP 14 02/15/2024    ALBUMIN 4.6 02/15/2024    LIPASE 63 10/29/2023    GFRMALE 88 09/12/2023     Lab Results   Component Value Date    TRIG 94 02/15/2024    CHOL 165 02/15/2024    LDLCALC 103 (H) 02/15/2024    HDL 43.1 02/15/2024     Lab Results   Component Value Date    HGBA1C 8.8 (H) 02/15/2024     The 10-year  ASCVD risk score (Salena FERNANDEZ, et al., 2019) is: 9.7%    Values used to calculate the score:      Age: 58 years      Sex: Male      Is Non- : Yes      Diabetic: Yes      Tobacco smoker: No      Systolic Blood Pressure: 102 mmHg      Is BP treated: No      HDL Cholesterol: 43.1 mg/dL      Total Cholesterol: 165 mg/dL      Assessment/Plan     Problem List Items Addressed This Visit       Type 2 diabetes mellitus without complication, without long-term current use of insulin ([facility])     Continue Trulicity 0.75 mg/0.5 mL - once weekly     Patient continuing lifestyle efforts, reports home BG doing better. Would like to re-evaluate dose in June.             Type 2 diabetes mellitus, is not at goal. Goal A1C <7%    Follow up: I recommend diabetes care be 5 weeks.    Marla Martinez PharmD [facility]  Clinical Pharmacist     Continue all meds under the continuation of care with the referring provider and clinical pharmacy team

## 2024-06-03 ENCOUNTER — TELEMEDICINE (OUTPATIENT)
Dept: PHARMACY | Facility: HOSPITAL | Age: 59
End: 2024-06-03
Payer: COMMERCIAL

## 2024-06-03 DIAGNOSIS — E11.9 TYPE 2 DIABETES MELLITUS WITHOUT COMPLICATION, WITHOUT LONG-TERM CURRENT USE OF INSULIN (MULTI): ICD-10-CM

## 2024-06-03 PROCEDURE — RXMED WILLOW AMBULATORY MEDICATION CHARGE

## 2024-06-03 RX ORDER — METFORMIN HYDROCHLORIDE 1000 MG/1
1000 TABLET ORAL
Qty: 90 TABLET | Refills: 1 | Status: SHIPPED | OUTPATIENT
Start: 2024-06-03

## 2024-06-03 RX ORDER — DULAGLUTIDE 1.5 MG/.5ML
1.5 INJECTION, SOLUTION SUBCUTANEOUS
Qty: 2 ML | Refills: 2 | Status: SHIPPED | OUTPATIENT
Start: 2024-06-09 | End: 2024-06-04 | Stop reason: SDUPTHER

## 2024-06-03 NOTE — PROGRESS NOTES
Subjective     Patient ID: Iraj Benavides is a 58 y.o. male who presents for Diabetes.    Diabetes  He presents for his follow-up diabetic visit. He has type 2 diabetes mellitus. His disease course has been worsening. He is compliant with treatment some of the time. He is following a generally healthy diet. Exercise: Active job.     Exercise: exercising more - running- walking - 2-3 days per week    Diet: Trying to eat more vegetables    Weight: 177 lbs     States no NOY since starting, will titrate conservatively given past intolerances.      Allergies   Allergen Reactions    Penicillins Unknown       Objective     Current DM Pharmacotherapy:   Trulicity 0.75 mg/0.5 mL - once weekly   Metformin 1000 mg - 1 tablet daily     Previous DM Pharmacotherapy:   Metformin- believed it was causing his back pain and lethargy - restarted on his own  Farxiga - UTI   Rybelsus- UTI and feels this was the cause     SECONDARY PREVENTION  - Statin? No  - ACE-I/ARB? No  - Aspirin? Yes    Current monitoring regimen:   Patient is using: glucometer    SMBG Readings:     FBG- 170-187 mg/dL     Any episodes of hypoglycemia? No  Hypoglycemia awareness? Yes    There were no vitals taken for this visit.    Pertinent PMH Review:  - PMH of Pancreatitis: No  - PMH/FH of Medullary Thyroid Cancer: No  - PMH of Retinopathy: No  - PMH of Urinary Tract Infections: Yes    Lab Review  Lab Results   Component Value Date    BILITOT 0.7 02/15/2024    CALCIUM 10.3 02/15/2024    CO2 28 02/15/2024     02/15/2024    CREATININE 1.00 02/15/2024    GLUCOSE 215 (H) 02/15/2024    ALKPHOS 60 02/15/2024    K 4.2 02/15/2024    PROT 7.3 02/15/2024     02/15/2024    AST 15 02/15/2024    ALT 17 02/15/2024    BUN 13 02/15/2024    ANIONGAP 14 02/15/2024    ALBUMIN 4.6 02/15/2024    LIPASE 63 10/29/2023    GFRMALE 88 09/12/2023     Lab Results   Component Value Date    TRIG 94 02/15/2024    CHOL 165 02/15/2024    LDLCALC 103 (H) 02/15/2024    HDL 43.1  02/15/2024     Lab Results   Component Value Date    HGBA1C 8.8 (H) 02/15/2024     The 10-year ASCVD risk score (Salena DK, et al., 2019) is: 9.7%    Values used to calculate the score:      Age: 58 years      Sex: Male      Is Non- : Yes      Diabetic: Yes      Tobacco smoker: No      Systolic Blood Pressure: 102 mmHg      Is BP treated: No      HDL Cholesterol: 43.1 mg/dL      Total Cholesterol: 165 mg/dL      Assessment/Plan     Problem List Items Addressed This Visit       Type 2 diabetes mellitus without complication, without long-term current use of insulin (Multi)     INCREASE to Trulicity 1.5 mg/0.5 mL - once weekly     CONTINUE   Metformin 1000 mg - 1 tablet daily          Relevant Medications    metFORMIN (Glucophage) 1,000 mg tablet    dulaglutide (Trulicity) 1.5 mg/0.5 mL pen injector injection       Type 2 diabetes mellitus, is not at goal. Goal A1C <7%    Follow up: I recommend diabetes care be 4 weeks.    Marla Martinez PharmD [facility]  Clinical Pharmacist     Continue all meds under the continuation of care with the referring provider and clinical pharmacy team

## 2024-06-04 PROCEDURE — RXMED WILLOW AMBULATORY MEDICATION CHARGE

## 2024-06-04 RX ORDER — DULAGLUTIDE 1.5 MG/.5ML
1.5 INJECTION, SOLUTION SUBCUTANEOUS
Qty: 2 ML | Refills: 2 | Status: SHIPPED | OUTPATIENT
Start: 2024-06-04

## 2024-06-04 NOTE — ASSESSMENT & PLAN NOTE
INCREASE to Trulicity 1.5 mg/0.5 mL - once weekly     CONTINUE   Metformin 1000 mg - 1 tablet daily

## 2024-06-07 ENCOUNTER — PHARMACY VISIT (OUTPATIENT)
Dept: PHARMACY | Facility: CLINIC | Age: 59
End: 2024-06-07
Payer: COMMERCIAL

## 2024-06-10 ENCOUNTER — OFFICE VISIT (OUTPATIENT)
Dept: PRIMARY CARE | Facility: CLINIC | Age: 59
End: 2024-06-10
Payer: COMMERCIAL

## 2024-06-10 VITALS
TEMPERATURE: 97.2 F | OXYGEN SATURATION: 96 % | WEIGHT: 185.6 LBS | HEART RATE: 83 BPM | SYSTOLIC BLOOD PRESSURE: 130 MMHG | HEIGHT: 68 IN | DIASTOLIC BLOOD PRESSURE: 58 MMHG | RESPIRATION RATE: 12 BRPM | BODY MASS INDEX: 28.13 KG/M2

## 2024-06-10 DIAGNOSIS — M62.838 NECK MUSCLE SPASM: ICD-10-CM

## 2024-06-10 DIAGNOSIS — E11.9 TYPE 2 DIABETES MELLITUS WITHOUT COMPLICATION, WITHOUT LONG-TERM CURRENT USE OF INSULIN (MULTI): Primary | ICD-10-CM

## 2024-06-10 PROCEDURE — 99214 OFFICE O/P EST MOD 30 MIN: CPT | Performed by: STUDENT IN AN ORGANIZED HEALTH CARE EDUCATION/TRAINING PROGRAM

## 2024-06-10 PROCEDURE — 3062F POS MACROALBUMINURIA REV: CPT | Performed by: STUDENT IN AN ORGANIZED HEALTH CARE EDUCATION/TRAINING PROGRAM

## 2024-06-10 PROCEDURE — 3049F LDL-C 100-129 MG/DL: CPT | Performed by: STUDENT IN AN ORGANIZED HEALTH CARE EDUCATION/TRAINING PROGRAM

## 2024-06-10 PROCEDURE — 3078F DIAST BP <80 MM HG: CPT | Performed by: STUDENT IN AN ORGANIZED HEALTH CARE EDUCATION/TRAINING PROGRAM

## 2024-06-10 PROCEDURE — 1036F TOBACCO NON-USER: CPT | Performed by: STUDENT IN AN ORGANIZED HEALTH CARE EDUCATION/TRAINING PROGRAM

## 2024-06-10 PROCEDURE — 3052F HG A1C>EQUAL 8.0%<EQUAL 9.0%: CPT | Performed by: STUDENT IN AN ORGANIZED HEALTH CARE EDUCATION/TRAINING PROGRAM

## 2024-06-10 PROCEDURE — 3075F SYST BP GE 130 - 139MM HG: CPT | Performed by: STUDENT IN AN ORGANIZED HEALTH CARE EDUCATION/TRAINING PROGRAM

## 2024-06-10 RX ORDER — LANCETS 28 GAUGE
EACH MISCELLANEOUS
Qty: 100 EACH | Refills: 3 | Status: SHIPPED | OUTPATIENT
Start: 2024-06-10

## 2024-06-10 RX ORDER — TIZANIDINE 2 MG/1
2 TABLET ORAL EVERY 6 HOURS PRN
Qty: 30 TABLET | Refills: 0 | Status: SHIPPED | OUTPATIENT
Start: 2024-06-10 | End: 2024-06-20

## 2024-06-10 RX ORDER — BLOOD-GLUCOSE METER
KIT MISCELLANEOUS
Qty: 100 STRIP | Refills: 3 | Status: SHIPPED | OUTPATIENT
Start: 2024-06-10

## 2024-06-10 ASSESSMENT — ENCOUNTER SYMPTOMS
CHILLS: 0
COUGH: 0

## 2024-06-10 NOTE — PATIENT INSTRUCTIONS
Blood work to be done before your visit with Marla.   To ensure you continue to receive care that you need, it is important to establish care with a new primary care provider. Our office can provide you with a list of PCPs who are accepting new patients in this area. You can also visit  website or call the  Central Scheduling line at [phone] to find a new primary care provider.

## 2024-06-10 NOTE — PROGRESS NOTES
Subjective   Patient ID: Iraj Benavides is a pleasant 58 y.o. male who presents for Follow-up (Follow up-4 month DM check. Pt stated has a cough with mucus, also pt has neck pain).  Cough  Associated symptoms include nasal congestion. Pertinent negatives include no chills. He has tried OTC cough suppressant for the symptoms. The treatment provided significant relief.   Reports that the symptoms initially started about 10 days ago and lasted about 7 days and have now resolved.  He checked himself for COVID at home which was negative.    - neck pain which was noted since last week.  No trauma  Trouble turning to the left as he feels that his neck is stiff.  Tried neck massager and NSAID which somewhat helped    - DM   Avg gluc 170 over the last 3 days since increasing Trulicity last week. highest was 200. No hypoglycemia.  He continues to follow-up with our clinical pharmacist, Marla.  His next appointment is scheduled for July 1.  He will get repeat blood work before his next visit with the clinical pharmacy team.      Review of Systems   Constitutional:  Negative for chills.   Respiratory:  Negative for cough.    All other systems reviewed and are negative.      Visit Vitals  /58 (Patient Position: Sitting)   Pulse 83   Temp 36.2 °C (97.2 °F)   Resp 12      Vitals:    06/10/24 1527   Weight: 84.2 kg (185 lb 9.6 oz)        Objective   Physical Exam  Constitutional:       General: He is not in acute distress.     Appearance: Normal appearance.   HENT:      Head: Normocephalic and atraumatic.   Eyes:      General: No scleral icterus.     Conjunctiva/sclera: Conjunctivae normal.   Cardiovascular:      Rate and Rhythm: Normal rate and regular rhythm.      Heart sounds: Normal heart sounds.   Pulmonary:      Effort: Pulmonary effort is normal.      Breath sounds: Normal breath sounds. No wheezing.   Abdominal:      General: Bowel sounds are normal. There is no distension.      Palpations: Abdomen is soft.       Tenderness: There is no abdominal tenderness.   Musculoskeletal:      Cervical back: Neck supple.      Right lower leg: No edema.      Left lower leg: No edema.   Lymphadenopathy:      Cervical: No cervical adenopathy.   Skin:     General: Skin is warm and dry.   Neurological:      General: No focal deficit present.      Mental Status: He is alert and oriented to person, place, and time.   Psychiatric:         Mood and Affect: Mood normal.         Behavior: Behavior normal.         Assessment/Plan   Problem List Items Addressed This Visit       Type 2 diabetes mellitus without complication, without long-term current use of insulin (Multi) - Primary    Relevant Medications    FreeStyle Lancets 28 gauge    blood sugar diagnostic (FreeStyle Lite Strips) strip    Other Relevant Orders    Hemoglobin A1C    Comprehensive Metabolic Panel     Other Visit Diagnoses       Neck muscle spasm        Relevant Medications    tiZANidine (Zanaflex) 2 mg tablet                   This note was dictated using voice recognition software and not corrected for grammatical or spelling errors.

## 2024-06-29 ENCOUNTER — LAB (OUTPATIENT)
Dept: LAB | Facility: LAB | Age: 59
End: 2024-06-29
Payer: COMMERCIAL

## 2024-06-29 DIAGNOSIS — E11.9 TYPE 2 DIABETES MELLITUS WITHOUT COMPLICATION, WITHOUT LONG-TERM CURRENT USE OF INSULIN (MULTI): ICD-10-CM

## 2024-06-29 LAB
ALBUMIN SERPL BCP-MCNC: 4.5 G/DL (ref 3.4–5)
ALP SERPL-CCNC: 49 U/L (ref 33–120)
ALT SERPL W P-5'-P-CCNC: 18 U/L (ref 10–52)
ANION GAP SERPL CALC-SCNC: 12 MMOL/L (ref 10–20)
AST SERPL W P-5'-P-CCNC: 19 U/L (ref 9–39)
BILIRUB SERPL-MCNC: 0.8 MG/DL (ref 0–1.2)
BUN SERPL-MCNC: 18 MG/DL (ref 6–23)
CALCIUM SERPL-MCNC: 9.8 MG/DL (ref 8.6–10.6)
CHLORIDE SERPL-SCNC: 103 MMOL/L (ref 98–107)
CO2 SERPL-SCNC: 27 MMOL/L (ref 21–32)
CREAT SERPL-MCNC: 1.37 MG/DL (ref 0.5–1.3)
EGFRCR SERPLBLD CKD-EPI 2021: 59 ML/MIN/1.73M*2
EST. AVERAGE GLUCOSE BLD GHB EST-MCNC: 154 MG/DL
GLUCOSE SERPL-MCNC: 138 MG/DL (ref 74–99)
HBA1C MFR BLD: 7 %
POTASSIUM SERPL-SCNC: 4 MMOL/L (ref 3.5–5.3)
PROT SERPL-MCNC: 7.2 G/DL (ref 6.4–8.2)
SODIUM SERPL-SCNC: 138 MMOL/L (ref 136–145)

## 2024-06-29 PROCEDURE — 80053 COMPREHEN METABOLIC PANEL: CPT

## 2024-06-29 PROCEDURE — 83036 HEMOGLOBIN GLYCOSYLATED A1C: CPT

## 2024-06-29 PROCEDURE — 36415 COLL VENOUS BLD VENIPUNCTURE: CPT

## 2024-07-01 ENCOUNTER — APPOINTMENT (OUTPATIENT)
Dept: PHARMACY | Facility: HOSPITAL | Age: 59
End: 2024-07-01
Payer: COMMERCIAL

## 2024-07-01 DIAGNOSIS — E11.9 TYPE 2 DIABETES MELLITUS WITHOUT COMPLICATION, WITHOUT LONG-TERM CURRENT USE OF INSULIN (MULTI): ICD-10-CM

## 2024-07-01 NOTE — PROGRESS NOTES
Subjective     Patient ID: Iraj Benavides is a 58 y.o. male who presents for Diabetes.    Diabetes  He presents for his follow-up diabetic visit. He has type 2 diabetes mellitus. His disease course has been improving. He is compliant with treatment some of the time. He is following a generally healthy diet. Exercise: Active job.     Exercise: exercising more - running- walking - 2-3 days per week    Diet: Trying to eat more vegetables    Weight: 185 lbs     States no NOY since starting Trulicity and none with dose increase    Allergies   Allergen Reactions    Penicillins Unknown       Objective     Current DM Pharmacotherapy:   Trulicity 1.5 mg/0.5 mL - once weekly   Metformin 1000 mg - 1 tablet daily     Previous DM Pharmacotherapy:   Metformin- believed it was causing his back pain and lethargy - restarted on his own  Farxiga - UTI   Rybelsus- UTI and feels this was the cause     SECONDARY PREVENTION  - Statin? No  - ACE-I/ARB? No  - Aspirin? Yes    Current monitoring regimen:   Patient is using: glucometer    SMBG Readings:     FBG-  ~130 mg/dL     Any episodes of hypoglycemia? No  Hypoglycemia awareness? Yes    There were no vitals taken for this visit.    Pertinent PMH Review:  - PMH of Pancreatitis: No  - PMH/FH of Medullary Thyroid Cancer: No  - PMH of Retinopathy: No  - PMH of Urinary Tract Infections: Yes    Lab Review  Lab Results   Component Value Date    BILITOT 0.8 06/29/2024    CALCIUM 9.8 06/29/2024    CO2 27 06/29/2024     06/29/2024    CREATININE 1.37 (H) 06/29/2024    GLUCOSE 138 (H) 06/29/2024    ALKPHOS 49 06/29/2024    K 4.0 06/29/2024    PROT 7.2 06/29/2024     06/29/2024    AST 19 06/29/2024    ALT 18 06/29/2024    BUN 18 06/29/2024    ANIONGAP 12 06/29/2024    ALBUMIN 4.5 06/29/2024    LIPASE 63 10/29/2023    GFRMALE 88 09/12/2023     Lab Results   Component Value Date    TRIG 94 02/15/2024    CHOL 165 02/15/2024    LDLCALC 103 (H) 02/15/2024    HDL 43.1 02/15/2024     Lab Results    Component Value Date    HGBA1C 7.0 (H) 06/29/2024     The 10-year ASCVD risk score (Salena FERNANDEZ, et al., 2019) is: 14.7%    Values used to calculate the score:      Age: 58 years      Sex: Male      Is Non- : Yes      Diabetic: Yes      Tobacco smoker: No      Systolic Blood Pressure: 130 mmHg      Is BP treated: No      HDL Cholesterol: 43.1 mg/dL      Total Cholesterol: 165 mg/dL      Assessment/Plan     Problem List Items Addressed This Visit       Type 2 diabetes mellitus without complication, without long-term current use of insulin (Multi)     Discussed improved A1C and goal to further decrease to <7% Also discussed increased SCr and decreased GFR. Patient had been taking some ibuprofen for back pain. Advised to avoid NSAIDs and stay well hydrated.     Continue   Trulicity 1.5 mg/0.5 mL - once weekly   Metformin 1000 mg - 1 tablet daily             Type 2 diabetes mellitus, is not at goal. Goal A1C <7%    Follow up: I recommend diabetes care be at discretion of new PCP    Marla FernandezD [facility]  Clinical Pharmacy Specialist, Primary Care     Continue all meds under the continuation of care with the referring provider and clinical pharmacy team

## 2024-07-01 NOTE — ASSESSMENT & PLAN NOTE
Discussed improved A1C and goal to further decrease to <7% Also discussed increased SCr and decreased GFR. Patient had been taking some ibuprofen for back pain. Advised to avoid NSAIDs and stay well hydrated.     Continue   Trulicity 1.5 mg/0.5 mL - once weekly   Metformin 1000 mg - 1 tablet daily

## 2024-07-09 DIAGNOSIS — E11.9 TYPE 2 DIABETES MELLITUS WITHOUT COMPLICATION, WITHOUT LONG-TERM CURRENT USE OF INSULIN (MULTI): ICD-10-CM

## 2024-07-09 PROCEDURE — RXMED WILLOW AMBULATORY MEDICATION CHARGE

## 2024-07-09 RX ORDER — DULAGLUTIDE 1.5 MG/.5ML
1.5 INJECTION, SOLUTION SUBCUTANEOUS
Qty: 2 ML | Refills: 0 | Status: SHIPPED | OUTPATIENT
Start: 2024-07-09

## 2024-07-13 ENCOUNTER — PHARMACY VISIT (OUTPATIENT)
Dept: PHARMACY | Facility: CLINIC | Age: 59
End: 2024-07-13
Payer: COMMERCIAL

## 2024-07-31 ENCOUNTER — APPOINTMENT (OUTPATIENT)
Dept: PRIMARY CARE | Facility: CLINIC | Age: 59
End: 2024-07-31
Payer: COMMERCIAL

## 2024-08-04 DIAGNOSIS — E11.9 TYPE 2 DIABETES MELLITUS WITHOUT COMPLICATION, WITHOUT LONG-TERM CURRENT USE OF INSULIN (MULTI): ICD-10-CM

## 2024-08-05 ENCOUNTER — APPOINTMENT (OUTPATIENT)
Dept: PHARMACY | Facility: HOSPITAL | Age: 59
End: 2024-08-05
Payer: COMMERCIAL

## 2024-08-05 RX ORDER — DULAGLUTIDE 1.5 MG/.5ML
1.5 INJECTION, SOLUTION SUBCUTANEOUS
Qty: 2 ML | Refills: 0 | OUTPATIENT
Start: 2024-08-11

## 2024-08-06 ENCOUNTER — TELEMEDICINE (OUTPATIENT)
Dept: PHARMACY | Facility: HOSPITAL | Age: 59
End: 2024-08-06
Payer: COMMERCIAL

## 2024-08-06 DIAGNOSIS — E11.9 TYPE 2 DIABETES MELLITUS WITHOUT COMPLICATION, WITHOUT LONG-TERM CURRENT USE OF INSULIN (MULTI): ICD-10-CM

## 2024-08-06 PROCEDURE — RXMED WILLOW AMBULATORY MEDICATION CHARGE

## 2024-08-06 RX ORDER — DULAGLUTIDE 1.5 MG/.5ML
1.5 INJECTION, SOLUTION SUBCUTANEOUS
Qty: 2 ML | Refills: 2 | Status: SHIPPED | OUTPATIENT
Start: 2024-08-06

## 2024-08-06 NOTE — PROGRESS NOTES
Subjective     Patient ID: Iraj Benavides is a 58 y.o. male who presents for No chief complaint on file..    Diabetes  He presents for his follow-up diabetic visit. He has type 2 diabetes mellitus. His disease course has been improving. He is compliant with treatment some of the time. He is following a generally healthy diet. Exercise: Active job.     Exercise: exercising more - running- walking - 2-3 days per week    Diet: Trying to eat more vegetables, staying well hydrated     Weight: 140 lbs     States no NOY since starting Trulicity and none with dose increase    Allergies   Allergen Reactions    Penicillins Unknown       Objective     Current DM Pharmacotherapy:   Trulicity 1.5 mg/0.5 mL - once weekly   Metformin 1000 mg - 1 tablet daily     Previous DM Pharmacotherapy:   Metformin- believed it was causing his back pain and lethargy - restarted on his own  Farxiga - UTI   Rybelsus- UTI and feels this was the cause     SECONDARY PREVENTION  - Statin? No  - ACE-I/ARB? No  - Aspirin? Yes    Current monitoring regimen:   Patient is using: glucometer    SMBG Readings:     FBG-  ~130 mg/dL     Any episodes of hypoglycemia? No  Hypoglycemia awareness? Yes    There were no vitals taken for this visit.    Pertinent PMH Review:  - PMH of Pancreatitis: No  - PMH/FH of Medullary Thyroid Cancer: No  - PMH of Retinopathy: No  - PMH of Urinary Tract Infections: Yes    Lab Review  Lab Results   Component Value Date    BILITOT 0.8 06/29/2024    CALCIUM 9.8 06/29/2024    CO2 27 06/29/2024     06/29/2024    CREATININE 1.37 (H) 06/29/2024    GLUCOSE 138 (H) 06/29/2024    ALKPHOS 49 06/29/2024    K 4.0 06/29/2024    PROT 7.2 06/29/2024     06/29/2024    AST 19 06/29/2024    ALT 18 06/29/2024    BUN 18 06/29/2024    ANIONGAP 12 06/29/2024    ALBUMIN 4.5 06/29/2024    LIPASE 63 10/29/2023    GFRMALE 88 09/12/2023     Lab Results   Component Value Date    TRIG 94 02/15/2024    CHOL 165 02/15/2024    LDLCALC 103 (H)  02/15/2024    HDL 43.1 02/15/2024     Lab Results   Component Value Date    HGBA1C 7.0 (H) 06/29/2024     The 10-year ASCVD risk score (Salena FERNANDEZ, et al., 2019) is: 14.7%    Values used to calculate the score:      Age: 58 years      Sex: Male      Is Non- : Yes      Diabetic: Yes      Tobacco smoker: No      Systolic Blood Pressure: 130 mmHg      Is BP treated: No      HDL Cholesterol: 43.1 mg/dL      Total Cholesterol: 165 mg/dL      Assessment/Plan     Problem List Items Addressed This Visit       Type 2 diabetes mellitus without complication, without long-term current use of insulin (Multi)       Type 2 diabetes mellitus, is not at goal. Goal A1C <7%    Follow up: I recommend diabetes care be at discretion of new PCP    Marla FernandezD [facility]  Clinical Pharmacy Specialist, Primary Care     Continue all meds under the continuation of care with the referring provider and clinical pharmacy team

## 2024-08-08 ENCOUNTER — PHARMACY VISIT (OUTPATIENT)
Dept: PHARMACY | Facility: CLINIC | Age: 59
End: 2024-08-08
Payer: COMMERCIAL

## 2024-08-31 PROCEDURE — RXMED WILLOW AMBULATORY MEDICATION CHARGE

## 2024-09-03 ENCOUNTER — PHARMACY VISIT (OUTPATIENT)
Dept: PHARMACY | Facility: CLINIC | Age: 59
End: 2024-09-03
Payer: COMMERCIAL

## 2024-09-03 ENCOUNTER — APPOINTMENT (OUTPATIENT)
Dept: PRIMARY CARE | Facility: CLINIC | Age: 59
End: 2024-09-03
Payer: COMMERCIAL

## 2024-09-03 ENCOUNTER — LAB (OUTPATIENT)
Dept: LAB | Facility: LAB | Age: 59
End: 2024-09-03
Payer: COMMERCIAL

## 2024-09-03 VITALS
HEIGHT: 68 IN | WEIGHT: 180 LBS | BODY MASS INDEX: 27.28 KG/M2 | HEART RATE: 71 BPM | SYSTOLIC BLOOD PRESSURE: 101 MMHG | DIASTOLIC BLOOD PRESSURE: 61 MMHG | OXYGEN SATURATION: 98 %

## 2024-09-03 DIAGNOSIS — Z23 NEED FOR SHINGLES VACCINE: ICD-10-CM

## 2024-09-03 DIAGNOSIS — E11.9 TYPE 2 DIABETES MELLITUS WITHOUT COMPLICATION, WITHOUT LONG-TERM CURRENT USE OF INSULIN (MULTI): ICD-10-CM

## 2024-09-03 DIAGNOSIS — I10 PRIMARY HYPERTENSION: ICD-10-CM

## 2024-09-03 DIAGNOSIS — E11.9 TYPE 2 DIABETES MELLITUS WITHOUT COMPLICATION, WITHOUT LONG-TERM CURRENT USE OF INSULIN (MULTI): Primary | ICD-10-CM

## 2024-09-03 DIAGNOSIS — M62.838 NECK MUSCLE SPASM: ICD-10-CM

## 2024-09-03 DIAGNOSIS — B37.9 YEAST INFECTION: ICD-10-CM

## 2024-09-03 DIAGNOSIS — Z23 NEED FOR PROPHYLACTIC VACCINATION AGAINST STREPTOCOCCUS PNEUMONIAE (PNEUMOCOCCUS): ICD-10-CM

## 2024-09-03 LAB
APPEARANCE UR: CLEAR
BILIRUB UR STRIP.AUTO-MCNC: NEGATIVE MG/DL
COLOR UR: ABNORMAL
EST. AVERAGE GLUCOSE BLD GHB EST-MCNC: 209 MG/DL
GLUCOSE UR STRIP.AUTO-MCNC: ABNORMAL MG/DL
HBA1C MFR BLD: 8.9 %
KETONES UR STRIP.AUTO-MCNC: ABNORMAL MG/DL
LEUKOCYTE ESTERASE UR QL STRIP.AUTO: NEGATIVE
NITRITE UR QL STRIP.AUTO: NEGATIVE
PH UR STRIP.AUTO: 5.5 [PH]
PROT UR STRIP.AUTO-MCNC: NEGATIVE MG/DL
RBC # UR STRIP.AUTO: NEGATIVE /UL
SP GR UR STRIP.AUTO: 1.02
UROBILINOGEN UR STRIP.AUTO-MCNC: NORMAL MG/DL

## 2024-09-03 PROCEDURE — 3062F POS MACROALBUMINURIA REV: CPT | Performed by: INTERNAL MEDICINE

## 2024-09-03 PROCEDURE — 36415 COLL VENOUS BLD VENIPUNCTURE: CPT

## 2024-09-03 PROCEDURE — 3078F DIAST BP <80 MM HG: CPT | Performed by: INTERNAL MEDICINE

## 2024-09-03 PROCEDURE — 3049F LDL-C 100-129 MG/DL: CPT | Performed by: INTERNAL MEDICINE

## 2024-09-03 PROCEDURE — 81003 URINALYSIS AUTO W/O SCOPE: CPT

## 2024-09-03 PROCEDURE — 1036F TOBACCO NON-USER: CPT | Performed by: INTERNAL MEDICINE

## 2024-09-03 PROCEDURE — 83036 HEMOGLOBIN GLYCOSYLATED A1C: CPT

## 2024-09-03 PROCEDURE — 90750 HZV VACC RECOMBINANT IM: CPT | Performed by: INTERNAL MEDICINE

## 2024-09-03 PROCEDURE — 3008F BODY MASS INDEX DOCD: CPT | Performed by: INTERNAL MEDICINE

## 2024-09-03 PROCEDURE — 3051F HG A1C>EQUAL 7.0%<8.0%: CPT | Performed by: INTERNAL MEDICINE

## 2024-09-03 PROCEDURE — 90472 IMMUNIZATION ADMIN EACH ADD: CPT | Performed by: INTERNAL MEDICINE

## 2024-09-03 PROCEDURE — 90677 PCV20 VACCINE IM: CPT | Performed by: INTERNAL MEDICINE

## 2024-09-03 PROCEDURE — 99214 OFFICE O/P EST MOD 30 MIN: CPT | Performed by: INTERNAL MEDICINE

## 2024-09-03 PROCEDURE — 3074F SYST BP LT 130 MM HG: CPT | Performed by: INTERNAL MEDICINE

## 2024-09-03 PROCEDURE — 90471 IMMUNIZATION ADMIN: CPT | Performed by: INTERNAL MEDICINE

## 2024-09-03 RX ORDER — FLUCONAZOLE 150 MG/1
150 TABLET ORAL ONCE
Qty: 1 TABLET | Refills: 0 | Status: SHIPPED | OUTPATIENT
Start: 2024-09-03 | End: 2024-09-03

## 2024-09-03 RX ORDER — TIZANIDINE 2 MG/1
2 TABLET ORAL EVERY 6 HOURS PRN
Qty: 30 TABLET | Refills: 0 | Status: SHIPPED | OUTPATIENT
Start: 2024-09-03 | End: 2024-09-13

## 2024-09-03 NOTE — ASSESSMENT & PLAN NOTE
Blood pressure well-controlled on current medications.  Advised low-salt diet and regular exercise.  Follow-up 3-month

## 2024-09-03 NOTE — ASSESSMENT & PLAN NOTE
Controlled on current medications with an A1c of 7.0 discussed that goal of 7.0 and less is the target.  Continue Trulicity and metformin.  Advise low carbohydrate diet.  Recheck A1c follow-up 3 months.  Advised annual eye exams.  Pneumonia vaccine today

## 2024-09-03 NOTE — PROGRESS NOTES
"Subjective   Patient ID: Iraj Benavides is a 58 y.o. male who presents for [facility].    HPI     Patient is a 58-year-old male with past medical history of hypertension diabetes mellitus type 2, dyslipidemia who presents to [facility] [place] [facility] care.  Patient needs a new PCP.    Patient has diabetes mellitus type 2.  Currently well-controlled with an A1c of 7.0.  Patient is currently on Trulicity and metformin.  With the up titration his A1c has been improving steadily over the last year.  He adheres to a low carbohydrate diet.  No increased thirst or increased urination and no numbness or tingling of the extremities.  Compliant with the medication and no side effects    Hypertension currently well-controlled on current medications.  No headaches changes in vision orthopnea    He does complain of neck pain when he turns to the right or left.  No recent trauma or injury.  The pain is dull and achy and nonradiating.  He has tried ibuprofen and Tylenol without substantial relief    Review of Systems  Constitutional: No fever or chills  Cardiovascular: no chest pain, no palpitations and no syncope.   Respiratory: no cough, no shortness of breath during exertion and no shortness of breath at rest.   Gastrointestinal: no abdominal pain, no nausea and no vomiting.  Neuro: No Headache, no dizziness    Objective   /61   Pulse 71   Ht 1.715 m (5' 7.5\")   Wt 81.6 kg (180 lb)   SpO2 98%   BMI 27.78 kg/m²     Physical Exam  Constitutional: Alert and in no acute distress. Well developed, well nourished  Head and Face: Head and face: Normal.    Cardiovascular: Heart rate and rhythm were normal, normal S1 and S2. No peripheral edema.   Pulmonary: No respiratory distress. Clear bilateral breath sounds.  Musculoskeletal: Paraspinal musculature of the C-spine tender to palpation with restricted range of motion  Skin: Normal skin color and pigmentation, normal skin turgor, and no rash.    Psychiatric: Judgment and insight: Intact. " Mood and affect: Normal.    Procedures    Lab Results   Component Value Date    WBC 6.9 10/29/2023    HGB 15.0 10/29/2023    HCT 46.6 10/29/2023     10/29/2023    CHOL 165 02/15/2024    TRIG 94 02/15/2024    HDL 43.1 02/15/2024    LDLDIRECT 189 (H) 05/20/2021    ALT 18 06/29/2024    AST 19 06/29/2024     06/29/2024    K 4.0 06/29/2024     06/29/2024    CREATININE 1.37 (H) 06/29/2024    BUN 18 06/29/2024    CO2 27 06/29/2024    HGBA1C 7.0 (H) 06/29/2024       CT abdomen pelvis wo IV contrast  Narrative: Interpreted By:  Jorge Luis Newby and Baker Zachary   STUDY:  CT ABDOMEN PELVIS WO IV CONTRAST;  10/29/2023 1:30 pm      INDICATION:  Signs/Symptoms:lower back pain.      COMPARISON:  None.      ACCESSION NUMBER(S):  [id number]      ORDERING CLINICIAN:  LISA OCHOA      TECHNIQUE:  Contiguous axial images of the abdomen and pelvis were obtained  without contrast. Coronal and sagittal reformatted images were  reconstructed from the axial data.      FINDINGS:  LOWER CHEST: Minimal bibasilar atelectasis. Otherwise no acute  abnormality.          ABDOMEN/PELVIS:      ABDOMINAL WALL: No significant abnormality.      LIVER: No significant parenchymal abnormality.      BILE DUCTS: No significant intrahepatic or extrahepatic dilatation.      GALLBLADDER: No significant abnormality.      PANCREAS: No significant abnormality.      SPLEEN: No significant abnormality.      ADRENALS: No significant abnormality.      KIDNEYS, URETERS, BLADDER: No significant abnormality.      REPRODUCTIVE ORGANS: No significant abnormality.      VESSELS: No significant abnormality.      RETROPERITONEUM/LYMPH NODES: No enlarged lymph nodes. No acute  retroperitoneal abnormality.      BOWEL/MESENTERY/PERITONEUM: No inflammatory bowel wall thickening or  dilatation. Colonic diverticulosis without evidence of  diverticulitis. Normal appendix.      No ascites, free air, or fluid collection.          MUSCULOSKELETAL: No acute  osseous abnormality. Benign appearing  fibro-osseous lytic lesion in the left iliac body measuring 2.9 cm is  noted. There is a dense 0.7 cm sclerotic lesion right iliac body  likely representing a bone island. Subchondral cystic change in the  posterior ileal side of the right sacroiliac joint is noted. Moderate  multilevel degenerative changes of the lower lumbar spine, most  pronounced at L5-S1. Notably, there is high-grade bilateral foraminal  stenosis at L5-S1 (right > left). There is also moderate bilateral  L4-L5 foraminal stenosis. There is no high-grade canal stenosis.      Impression: 1. No acute abnormality of the abdomen or pelvis.  2. Moderate multilevel degenerative changes of the lower lumbar  spine, most pronounced at L5-S1, notable for high-grade bilateral  foraminal stenosis (right > left).  3. Colonic diverticulosis without evidence of diverticulitis.          I personally reviewed the images/study and I agree with the findings  as stated. This study was interpreted at [place], [place].      MACRO:  None.      Signed by: Jorge Luis Newby 10/29/2023 2:05 PM  Dictation workstation:   [id number]            Assessment/Plan   Problem List Items Addressed This Visit             ICD-10-CM    Type 2 diabetes mellitus without complication, without long-term current use of insulin (Multi) - Primary E11.9     Controlled on current medications with an A1c of 7.0 discussed that goal of 7.0 and less is the target.  Continue Trulicity and metformin.  Advise low carbohydrate diet.  Recheck A1c follow-up 3 months.  Advised annual eye exams.  Pneumonia vaccine today           Relevant Orders    Hemoglobin A1C    Urinalysis with Reflex Microscopic    Follow Up In [facility] - PCP - Established    Yeast infection B37.9     Possible yeast infection on the glans of the penis.  Start Diflucan once.  Check urinalysis         Relevant Medications    fluconazole  (Diflucan) 150 mg tablet    Neck muscle spasm M62.838     Likely musculoskeletal.  Start tizanidine as needed.  Encourage stretches and exercises.         Relevant Medications    tiZANidine (Zanaflex) 2 mg tablet    Primary hypertension I10     Blood pressure well-controlled on current medications.  Advised low-salt diet and regular exercise.  Follow-up 3-month          Other Visit Diagnoses         Codes    Need for prophylactic vaccination against Streptococcus pneumoniae (pneumococcus)     Z23    Relevant Orders    Pneumococcal conjugate vaccine, 20-valent (PREVNAR 20) (Completed)    Need for shingles vaccine     Z23    Relevant Orders    Zoster vaccine, recombinant, adult (SHINGRIX) (Completed)

## 2024-09-11 DIAGNOSIS — E11.9 TYPE 2 DIABETES MELLITUS WITHOUT COMPLICATION, WITHOUT LONG-TERM CURRENT USE OF INSULIN (MULTI): ICD-10-CM

## 2024-09-23 DIAGNOSIS — E11.9 TYPE 2 DIABETES MELLITUS WITHOUT COMPLICATION, WITHOUT LONG-TERM CURRENT USE OF INSULIN (MULTI): ICD-10-CM

## 2024-09-23 RX ORDER — DULAGLUTIDE 1.5 MG/.5ML
1.5 INJECTION, SOLUTION SUBCUTANEOUS
Qty: 2 ML | Refills: 2 | Status: SHIPPED | OUTPATIENT
Start: 2024-09-23

## 2024-09-23 NOTE — PROGRESS NOTES
Subjective     Patient ID: Iraj Benavides is a 58 y.o. male who presents for Diabetes.     Diabetes  He presents for his follow-up diabetic visit. He has type 2 diabetes mellitus. His disease course has been fluctuating. He is compliant with treatment some of the time. He is following a generally healthy diet. Exercise: Active job.     Exercise: exercising more - running- walking - 2-3 days per week-- still doing this, but fluctuates based on what the weather is doing (too hot, raining)    Diet: Trying to eat more vegetables, staying well hydrated     Weight: 140 lbs     States no NOY since starting Trulicity and none with dose increase    Allergies   Allergen Reactions    Penicillins Unknown       Objective     Current DM Pharmacotherapy:   Trulicity 3 mg/0.5 mL - once weekly   Metformin 1000 mg - 1 tablet daily     Clarifications to Regimen: PCP increased Trulicity due to some higher sugar readings and to get a lower A1c.  Adverse Drug Reactions: None    Previous DM Pharmacotherapy:   Metformin- believed it was causing his back pain and lethargy - restarted on his own  Farxiga - UTI   Rybelsus- UTI and feels this was the cause     SECONDARY PREVENTION  - Statin? No  - ACE-I/ARB? No  - Aspirin? Yes    Current monitoring regimen:   Patient is using: glucometer    SMBG Readings:     FBG:  Today FB mg/dL-- has recently been happening, but going back down to 150-160s mg/dL during the day.    Any episodes of hypoglycemia? No  Hypoglycemia awareness? Yes    There were no vitals taken for this visit.    Pertinent PMH Review:  - PMH of Pancreatitis: No  - PMH/FH of Medullary Thyroid Cancer: No  - PMH of Retinopathy: No  - PMH of Urinary Tract Infections: Yes    Lab Review  Lab Results   Component Value Date    BILITOT 0.8 2024    CALCIUM 9.8 2024    CO2 27 2024     2024    CREATININE 1.37 (H) 2024    GLUCOSE 138 (H) 2024    ALKPHOS 49 2024    K 4.0 2024    PROT 7.2  06/29/2024     06/29/2024    AST 19 06/29/2024    ALT 18 06/29/2024    BUN 18 06/29/2024    ANIONGAP 12 06/29/2024    ALBUMIN 4.5 06/29/2024    LIPASE 63 10/29/2023    GFRMALE 88 09/12/2023     Lab Results   Component Value Date    TRIG 94 02/15/2024    CHOL 165 02/15/2024    LDLCALC 103 (H) 02/15/2024    HDL 43.1 02/15/2024     Lab Results   Component Value Date    HGBA1C 8.9 (H) 09/03/2024     The 10-year ASCVD risk score (Salena FERNANDEZ, et al., 2019) is: 9.6%    Values used to calculate the score:      Age: 58 years      Sex: Male      Is Non- : Yes      Diabetic: Yes      Tobacco smoker: No      Systolic Blood Pressure: 101 mmHg      Is BP treated: No      HDL Cholesterol: 43.1 mg/dL      Total Cholesterol: 165 mg/dL      Assessment/Plan     Problem List Items Addressed This Visit    None    Type 2 diabetes mellitus:   Patient has noticed increased sugar readings. Aware that his PCP recently increased him to the Trulicity 3 mg. Patient willing to try increased dose and will pick it up from the pharmacy. Patient trying to continue to watch diet and limit sugars to get his FBG back into a desired range.    CONTINUE  Trulicity 3 mg/0.5 mL - once weekly (increased from PCP)  Metformin 1000 mg - 1 tablet daily     Type 2 diabetes mellitus, is not at goal. Goal A1C <7%    Follow up: I recommend diabetes care be in 4 weeks--  10/22 @ 8:30 AM     BE Esteban Pharmacy Student  Preceptor: Marla Martinez PharmD [facility]  Clinical Pharmacy Specialist, Primary Care     Continue all meds under the continuation of care with the referring provider and clinical pharmacy team

## 2024-09-24 ENCOUNTER — APPOINTMENT (OUTPATIENT)
Dept: PHARMACY | Facility: HOSPITAL | Age: 59
End: 2024-09-24
Payer: COMMERCIAL

## 2024-09-24 DIAGNOSIS — E11.9 TYPE 2 DIABETES MELLITUS WITHOUT COMPLICATION, WITHOUT LONG-TERM CURRENT USE OF INSULIN (MULTI): ICD-10-CM

## 2024-10-07 DIAGNOSIS — E11.9 TYPE 2 DIABETES MELLITUS WITHOUT COMPLICATION, WITHOUT LONG-TERM CURRENT USE OF INSULIN (MULTI): ICD-10-CM

## 2024-10-07 RX ORDER — LANCING DEVICE
EACH MISCELLANEOUS
Qty: 1 EACH | Refills: 0 | Status: SHIPPED | OUTPATIENT
Start: 2024-10-07

## 2024-10-22 ENCOUNTER — APPOINTMENT (OUTPATIENT)
Dept: PHARMACY | Facility: HOSPITAL | Age: 59
End: 2024-10-22
Payer: COMMERCIAL

## 2024-10-22 DIAGNOSIS — E11.9 TYPE 2 DIABETES MELLITUS WITHOUT COMPLICATION, WITHOUT LONG-TERM CURRENT USE OF INSULIN (MULTI): ICD-10-CM

## 2024-10-22 PROCEDURE — RXMED WILLOW AMBULATORY MEDICATION CHARGE

## 2024-10-22 RX ORDER — METFORMIN HYDROCHLORIDE 1000 MG/1
1000 TABLET ORAL
Qty: 180 TABLET | Refills: 1 | Status: SHIPPED | OUTPATIENT
Start: 2024-10-22

## 2024-10-22 NOTE — PROGRESS NOTES
"Subjective     Patient ID: Iraj Benavides is a 58 y.o. male who presents for Diabetes.     Diabetes  He presents for his follow-up diabetic visit. He has type 2 diabetes mellitus. His disease course has been fluctuating. He is compliant with treatment some of the time. He is following a generally healthy diet. Exercise: Active job.     Exercise: active at work and \"side jobs\" involve heavy lifting     Diet: Trying to eat more vegetables, staying well hydrated - has been eating     Weight: 167 lbs - pt reported     States no NOY since starting Trulicity and none with dose increase    Allergies   Allergen Reactions    Penicillins Unknown       Objective     Current DM Pharmacotherapy:   Trulicity 1.5 mg/0.5 mL - once weekly   Metformin 1000 mg - 1 tablet twice daily     Clarifications to Regimen: Patient states taking Metformin BID despite every day Rx - never picked up Trulicity 3 mg  Adverse Drug Reactions: None    Previous DM Pharmacotherapy:   Metformin- believed it was causing his back pain and lethargy - restarted on his own  Farxiga - UTI   Rybelsus- UTI and feels this was the cause     SECONDARY PREVENTION  - Statin? No  - ACE-I/ARB? No  - Aspirin? Yes    Current monitoring regimen:   Patient is using: glucometer    SMBG Readings:     FB mg/dL       Any episodes of hypoglycemia? No  Hypoglycemia awareness? Yes    There were no vitals taken for this visit.    Pertinent PMH Review:  - PMH of Pancreatitis: No  - PMH/FH of Medullary Thyroid Cancer: No  - PMH of Retinopathy: No  - PMH of Urinary Tract Infections: Yes    Lab Review  Lab Results   Component Value Date    BILITOT 0.8 2024    CALCIUM 9.8 2024    CO2 27 2024     2024    CREATININE 1.37 (H) 2024    GLUCOSE 138 (H) 2024    ALKPHOS 49 2024    K 4.0 2024    PROT 7.2 2024     2024    AST 19 2024    ALT 18 2024    BUN 18 2024    ANIONGAP 12 2024    ALBUMIN 4.5 " 06/29/2024    LIPASE 63 10/29/2023    GFRMALE 88 09/12/2023     Lab Results   Component Value Date    TRIG 94 02/15/2024    CHOL 165 02/15/2024    LDLCALC 103 (H) 02/15/2024    HDL 43.1 02/15/2024     Lab Results   Component Value Date    HGBA1C 8.9 (H) 09/03/2024     The 10-year ASCVD risk score (Salena FERNANDEZ, et al., 2019) is: 9.6%    Values used to calculate the score:      Age: 58 years      Sex: Male      Is Non- : Yes      Diabetic: Yes      Tobacco smoker: No      Systolic Blood Pressure: 101 mmHg      Is BP treated: No      HDL Cholesterol: 43.1 mg/dL      Total Cholesterol: 165 mg/dL      Assessment/Plan     Problem List Items Addressed This Visit       Type 2 diabetes mellitus without complication, without long-term current use of insulin (Multi)     Patient never picked up Trulicity 3 mg from [facility], [facility] co-pay was high. Copay reasonable from , will mail to patient. Home BG has been elevated. Discussed need to watch what he is eating and keep sugar/carb portions limited. Patient does state he has been taking Metformin twice daily.     INCREASE to Trulicity 3 mg/0.5 mL - once weekly     CONTINUE   Metformin 1000 mg - 1 tablet twice daily               Type 2 diabetes mellitus, is not at goal. Goal A1C <7%    Follow up: I recommend diabetes care be in 2 weeks     Marla Martinez PharmD [facility]  Clinical Pharmacy Specialist, Primary Care     Continue all meds under the continuation of care with the referring provider and clinical pharmacy team

## 2024-10-22 NOTE — ASSESSMENT & PLAN NOTE
Patient never picked up Trulicity 3 mg from [employer], states co-pay was high. Copay reasonable from , will mail to patient. Home BG has been elevated. Discussed need to watch what he is eating and keep sugar/carb portions limited. Patient does state he has been taking Metformin twice daily.     INCREASE to Trulicity 3 mg/0.5 mL - once weekly     CONTINUE   Metformin 1000 mg - 1 tablet twice daily

## 2024-10-24 ENCOUNTER — PHARMACY VISIT (OUTPATIENT)
Dept: PHARMACY | Facility: CLINIC | Age: 59
End: 2024-10-24
Payer: COMMERCIAL

## 2024-11-05 ENCOUNTER — APPOINTMENT (OUTPATIENT)
Dept: PHARMACY | Facility: HOSPITAL | Age: 59
End: 2024-11-05
Payer: COMMERCIAL

## 2024-11-05 DIAGNOSIS — E11.9 TYPE 2 DIABETES MELLITUS WITHOUT COMPLICATION, WITHOUT LONG-TERM CURRENT USE OF INSULIN (MULTI): ICD-10-CM

## 2024-11-05 RX ORDER — LANCETS 28 GAUGE
EACH MISCELLANEOUS
Qty: 100 EACH | Refills: 3 | Status: SHIPPED | OUTPATIENT
Start: 2024-11-05

## 2024-11-05 RX ORDER — BLOOD-GLUCOSE METER
KIT MISCELLANEOUS
Qty: 100 STRIP | Refills: 3 | Status: SHIPPED | OUTPATIENT
Start: 2024-11-05

## 2024-11-05 RX ORDER — DULAGLUTIDE 4.5 MG/.5ML
4.5 INJECTION, SOLUTION SUBCUTANEOUS WEEKLY
Qty: 2 ML | Refills: 11 | Status: SHIPPED | OUTPATIENT
Start: 2024-11-05

## 2024-11-05 NOTE — PROGRESS NOTES
"Subjective     Patient ID: Iraj Benavides is a 58 y.o. male who presents for Diabetes.     Diabetes  He presents for his follow-up diabetic visit. He has type 2 diabetes mellitus. His disease course has been fluctuating. He is compliant with treatment some of the time. He is following a generally healthy diet. Exercise: Active job.     Exercise: active at work and \"side jobs\" involve heavy lifting     Diet: Trying to eat more vegetables, staying well hydrated - 6 PM is last food (sometimes has a snack later)     Weight: 166 lbs - pt reported     States no NOY since starting Trulicity and none with dose increase    Allergies   Allergen Reactions    Penicillins Unknown       Objective     Current DM Pharmacotherapy:   Trulicity 3 mg/0.5 mL - once weekly (Mondays)   Metformin 1000 mg - 1 tablet twice daily     Clarifications to Regimen: Has taken 3 doses of Trulicity 3 mg   Adverse Drug Reactions: None    Previous DM Pharmacotherapy:   Farxiga - UTI   Rybelsus- UTI and feels this was the cause     SECONDARY PREVENTION  - Statin? No  - ACE-I/ARB? No  - Aspirin? Yes    Current monitoring regimen:   Patient is using: glucometer    SMBG Readings:     FBG (6:30 AM): ~230 mg/dL       Any episodes of hypoglycemia? No  Hypoglycemia awareness? Yes    There were no vitals taken for this visit.    Pertinent PMH Review:  - PMH of Pancreatitis: No  - PMH/FH of Medullary Thyroid Cancer: No  - PMH of Retinopathy: No  - PMH of Urinary Tract Infections: Yes    Lab Review  Lab Results   Component Value Date    BILITOT 0.8 06/29/2024    CALCIUM 9.8 06/29/2024    CO2 27 06/29/2024     06/29/2024    CREATININE 1.37 (H) 06/29/2024    GLUCOSE 138 (H) 06/29/2024    ALKPHOS 49 06/29/2024    K 4.0 06/29/2024    PROT 7.2 06/29/2024     06/29/2024    AST 19 06/29/2024    ALT 18 06/29/2024    BUN 18 06/29/2024    ANIONGAP 12 06/29/2024    ALBUMIN 4.5 06/29/2024    LIPASE 63 10/29/2023    GFRMALE 88 09/12/2023     Lab Results   Component " Value Date    TRIG 94 02/15/2024    CHOL 165 02/15/2024    LDLCALC 103 (H) 02/15/2024    HDL 43.1 02/15/2024     Lab Results   Component Value Date    HGBA1C 8.9 (H) 09/03/2024     The 10-year ASCVD risk score (Salena FERNANDEZ, et al., 2019) is: 9.6%    Values used to calculate the score:      Age: 58 years      Sex: Male      Is Non- : Yes      Diabetic: Yes      Tobacco smoker: No      Systolic Blood Pressure: 101 mmHg      Is BP treated: No      HDL Cholesterol: 43.1 mg/dL      Total Cholesterol: 165 mg/dL      Assessment/Plan     Problem List Items Addressed This Visit       Type 2 diabetes mellitus without complication, without long-term current use of insulin (Multi)     Home BG remains elevated but has improved. Discussed importance of BG control to prevent macro and micro vascular complications.     INCREASE to Trulicity 4.5 mg/0.5 mL - once weekly     CONTINUE   Metformin 1000 mg - 1 tablet twice daily           Type 2 diabetes mellitus, is not at goal. Goal A1C <7%    Follow up: I recommend diabetes care be in 4 weeks     Marla Martinez PharmD [facility]  Clinical Pharmacy Specialist, Primary Care     Continue all meds under the continuation of care with the referring provider and clinical pharmacy team

## 2024-11-05 NOTE — ASSESSMENT & PLAN NOTE
Home BG remains elevated but has improved. Discussed importance of BG control to prevent macro and micro vascular complications.     INCREASE to Trulicity 4.5 mg/0.5 mL - once weekly     CONTINUE   Metformin 1000 mg - 1 tablet twice daily   
Yes

## 2024-11-07 ENCOUNTER — OFFICE VISIT (OUTPATIENT)
Dept: PRIMARY CARE | Facility: CLINIC | Age: 59
End: 2024-11-07
Payer: COMMERCIAL

## 2024-11-07 VITALS
WEIGHT: 173 LBS | HEART RATE: 75 BPM | SYSTOLIC BLOOD PRESSURE: 123 MMHG | BODY MASS INDEX: 26.7 KG/M2 | OXYGEN SATURATION: 97 % | DIASTOLIC BLOOD PRESSURE: 73 MMHG

## 2024-11-07 DIAGNOSIS — R14.0 ABDOMINAL BLOATING: Primary | ICD-10-CM

## 2024-11-07 PROCEDURE — 3074F SYST BP LT 130 MM HG: CPT | Performed by: INTERNAL MEDICINE

## 2024-11-07 PROCEDURE — 1036F TOBACCO NON-USER: CPT | Performed by: INTERNAL MEDICINE

## 2024-11-07 PROCEDURE — 3062F POS MACROALBUMINURIA REV: CPT | Performed by: INTERNAL MEDICINE

## 2024-11-07 PROCEDURE — 3049F LDL-C 100-129 MG/DL: CPT | Performed by: INTERNAL MEDICINE

## 2024-11-07 PROCEDURE — 3078F DIAST BP <80 MM HG: CPT | Performed by: INTERNAL MEDICINE

## 2024-11-07 PROCEDURE — 99213 OFFICE O/P EST LOW 20 MIN: CPT | Performed by: INTERNAL MEDICINE

## 2024-11-07 PROCEDURE — 3052F HG A1C>EQUAL 8.0%<EQUAL 9.0%: CPT | Performed by: INTERNAL MEDICINE

## 2024-11-07 RX ORDER — OMEPRAZOLE 40 MG/1
40 CAPSULE, DELAYED RELEASE ORAL
Qty: 10 CAPSULE | Refills: 0 | Status: SHIPPED | OUTPATIENT
Start: 2024-11-07

## 2024-11-07 RX ORDER — ONDANSETRON 4 MG/1
4 TABLET, ORALLY DISINTEGRATING ORAL EVERY 8 HOURS PRN
Qty: 9 TABLET | Refills: 0 | Status: SHIPPED | OUTPATIENT
Start: 2024-11-07

## 2024-11-07 NOTE — PROGRESS NOTES
Subjective   Patient ID: Iraj Benavides is a 58 y.o. male who presents for Abdominal Pain.    HPI     Patient is a 58-year-old male with past medical history of diabetes who presents with chief complaint of abdominal bloating.  Mostly affecting the epigastric area and wrapping around the right side of the abdomen.  Began this morning.  It seems to be moving depending on his position.  There is no abdominal pain at this time.  No nausea vomiting or diarrhea.  No recent sick contacts and no fevers or chills    Review of Systems  Constitutional: No fever or chills  Cardiovascular: no chest pain, no palpitations and no syncope.   Respiratory: no cough, no shortness of breath during exertion and no shortness of breath at rest.   Gastrointestinal: no abdominal pain, no nausea and no vomiting.  Neuro: No Headache, no dizziness    Objective   /73   Pulse 75   Wt 78.5 kg (173 lb)   SpO2 97%   BMI 26.70 kg/m²     Physical Exam  Constitutional: Alert and in no acute distress. Well developed, well nourished  Head and Face: Head and face: Normal.    Cardiovascular: Heart rate and rhythm were normal, normal S1 and S2. No peripheral edema.   Pulmonary: No respiratory distress. Clear bilateral breath sounds.  Musculoskeletal: Gait and station: Normal. Muscle strength/tone: Normal.   Skin: Normal skin color and pigmentation, normal skin turgor, and no rash.    Psychiatric: Judgment and insight: Intact. Mood and affect: Normal.    Procedures    Lab Results   Component Value Date    WBC 6.9 10/29/2023    HGB 15.0 10/29/2023    HCT 46.6 10/29/2023     10/29/2023    CHOL 165 02/15/2024    TRIG 94 02/15/2024    HDL 43.1 02/15/2024    LDLDIRECT 189 (H) 05/20/2021    ALT 18 06/29/2024    AST 19 06/29/2024     06/29/2024    K 4.0 06/29/2024     06/29/2024    CREATININE 1.37 (H) 06/29/2024    BUN 18 06/29/2024    CO2 27 06/29/2024    HGBA1C 8.9 (H) 09/03/2024       CT abdomen pelvis wo IV contrast  Narrative:  Interpreted By:  Jorge Luis Newby and Baker Zachary   STUDY:  CT ABDOMEN PELVIS WO IV CONTRAST;  10/29/2023 1:30 pm      INDICATION:  Signs/Symptoms:lower back pain.      COMPARISON:  None.      ACCESSION NUMBER(S):  [id number]      ORDERING CLINICIAN:  LISA OCHOA      TECHNIQUE:  Contiguous axial images of the abdomen and pelvis were obtained  without contrast. Coronal and sagittal reformatted images were  reconstructed from the axial data.      FINDINGS:  LOWER CHEST: Minimal bibasilar atelectasis. Otherwise no acute  abnormality.          ABDOMEN/PELVIS:      ABDOMINAL WALL: No significant abnormality.      LIVER: No significant parenchymal abnormality.      BILE DUCTS: No significant intrahepatic or extrahepatic dilatation.      GALLBLADDER: No significant abnormality.      PANCREAS: No significant abnormality.      SPLEEN: No significant abnormality.      ADRENALS: No significant abnormality.      KIDNEYS, URETERS, BLADDER: No significant abnormality.      REPRODUCTIVE ORGANS: No significant abnormality.      VESSELS: No significant abnormality.      RETROPERITONEUM/LYMPH NODES: No enlarged lymph nodes. No acute  retroperitoneal abnormality.      BOWEL/MESENTERY/PERITONEUM: No inflammatory bowel wall thickening or  dilatation. Colonic diverticulosis without evidence of  diverticulitis. Normal appendix.      No ascites, free air, or fluid collection.          MUSCULOSKELETAL: No acute osseous abnormality. Benign appearing  fibro-osseous lytic lesion in the left iliac body measuring 2.9 cm is  noted. There is a dense 0.7 cm sclerotic lesion right iliac body  likely representing a bone island. Subchondral cystic change in the  posterior ileal side of the right sacroiliac joint is noted. Moderate  multilevel degenerative changes of the lower lumbar spine, most  pronounced at L5-S1. Notably, there is high-grade bilateral foraminal  stenosis at L5-S1 (right > left). There is also moderate bilateral  L4-L5  foraminal stenosis. There is no high-grade canal stenosis.      Impression: 1. No acute abnormality of the abdomen or pelvis.  2. Moderate multilevel degenerative changes of the lower lumbar  spine, most pronounced at L5-S1, notable for high-grade bilateral  foraminal stenosis (right > left).  3. Colonic diverticulosis without evidence of diverticulitis.          I personally reviewed the images/study and I agree with the findings  as stated. This study was interpreted at [place], [place].      MACRO:  None.      Signed by: Jorge Luis Newby 10/29/2023 2:05 PM  Dictation workstation:   [id number]            Assessment/Plan   Problem List Items Addressed This Visit    None  Visit Diagnoses         Codes    Abdominal bloating    -  Primary R14.0    Relevant Medications    ondansetron ODT (Zofran-ODT) 4 mg disintegrating tablet    omeprazole (PriLOSEC) 40 mg DR capsule        Physical exam benign.  No evidence of pain.  Likely abdominal bloating.  Will start ondansetron and omeprazole.  If you develop worsening abdominal pain fevers blood in the stool or vomiting please go to the urgent care for evaluation

## 2024-11-07 NOTE — LETTER
November 7, 2024     Patient: Iraj Benavides   YOB: 1965   Date of Visit: 11/7/2024       To Whom It May Concern:    Iraj Benavides was seen in my clinic on 11/7/2024 at 10:15 am. Please excuse Iraj for his absence from work on this day to make the appointment.    If you have any questions or concerns, please don't hesitate to call.         Sincerely,         Jairon Evans,         CC: No Recipients

## 2024-11-14 PROCEDURE — RXMED WILLOW AMBULATORY MEDICATION CHARGE

## 2024-11-15 ENCOUNTER — PHARMACY VISIT (OUTPATIENT)
Dept: PHARMACY | Facility: CLINIC | Age: 59
End: 2024-11-15
Payer: COMMERCIAL

## 2024-11-15 PROCEDURE — RXMED WILLOW AMBULATORY MEDICATION CHARGE

## 2024-11-25 DIAGNOSIS — E11.9 TYPE 2 DIABETES MELLITUS WITHOUT COMPLICATION, WITHOUT LONG-TERM CURRENT USE OF INSULIN (MULTI): Primary | ICD-10-CM

## 2024-12-03 ENCOUNTER — APPOINTMENT (OUTPATIENT)
Dept: PRIMARY CARE | Facility: CLINIC | Age: 59
End: 2024-12-03
Payer: COMMERCIAL

## 2024-12-03 ENCOUNTER — TELEMEDICINE (OUTPATIENT)
Dept: PHARMACY | Facility: HOSPITAL | Age: 59
End: 2024-12-03
Payer: COMMERCIAL

## 2024-12-03 VITALS — HEART RATE: 80 BPM | DIASTOLIC BLOOD PRESSURE: 77 MMHG | SYSTOLIC BLOOD PRESSURE: 104 MMHG

## 2024-12-03 DIAGNOSIS — E11.9 TYPE 2 DIABETES MELLITUS WITHOUT COMPLICATION, WITHOUT LONG-TERM CURRENT USE OF INSULIN (MULTI): ICD-10-CM

## 2024-12-03 DIAGNOSIS — L30.9 DERMATITIS: Primary | ICD-10-CM

## 2024-12-03 PROCEDURE — 3074F SYST BP LT 130 MM HG: CPT | Performed by: INTERNAL MEDICINE

## 2024-12-03 PROCEDURE — 3052F HG A1C>EQUAL 8.0%<EQUAL 9.0%: CPT | Performed by: INTERNAL MEDICINE

## 2024-12-03 PROCEDURE — 1036F TOBACCO NON-USER: CPT | Performed by: INTERNAL MEDICINE

## 2024-12-03 PROCEDURE — 3078F DIAST BP <80 MM HG: CPT | Performed by: INTERNAL MEDICINE

## 2024-12-03 PROCEDURE — 3049F LDL-C 100-129 MG/DL: CPT | Performed by: INTERNAL MEDICINE

## 2024-12-03 PROCEDURE — 99213 OFFICE O/P EST LOW 20 MIN: CPT | Performed by: INTERNAL MEDICINE

## 2024-12-03 PROCEDURE — 3062F POS MACROALBUMINURIA REV: CPT | Performed by: INTERNAL MEDICINE

## 2024-12-03 RX ORDER — METFORMIN HYDROCHLORIDE 1000 MG/1
1000 TABLET ORAL
Qty: 180 TABLET | Refills: 1 | Status: SHIPPED | OUTPATIENT
Start: 2024-12-03

## 2024-12-03 RX ORDER — BLOOD-GLUCOSE SENSOR
EACH MISCELLANEOUS
Qty: 2 EACH | Refills: 0 | Status: SHIPPED | OUTPATIENT
Start: 2024-12-03

## 2024-12-03 RX ORDER — GLIPIZIDE 5 MG/1
5 TABLET, FILM COATED, EXTENDED RELEASE ORAL DAILY
Qty: 30 TABLET | Refills: 2 | Status: SHIPPED | OUTPATIENT
Start: 2024-12-03

## 2024-12-03 RX ORDER — TRIAMCINOLONE ACETONIDE 1 MG/G
OINTMENT TOPICAL 2 TIMES DAILY PRN
Qty: 80 G | Refills: 0 | Status: SHIPPED | OUTPATIENT
Start: 2024-12-03 | End: 2025-04-02

## 2024-12-03 NOTE — ASSESSMENT & PLAN NOTE
Home BG remain significantly elevated despite escalation in Trulicity dose. Confirmed adherence to DM regimen. -320 mg/dL. Patient reports a generally healthy diet overall but does admit to a lot of pies and cookies since Thanksgiving.     Patient has hx of UTI on Farxiga. No other previous DM therapy. Prefers oral medications. Discussed that insulin will become necessary if BG does not improve.     START Glipizide ER 5 mg - once daily with first main meal of the day     CONTINUE   Trulicity 4.5 mg/0.5 mL - once weekly (Mondays)  Metformin 1000 mg - 1 tablet twice daily    Will attempt PA for Kori 3 Plus for better glucose monitoring

## 2024-12-03 NOTE — PROGRESS NOTES
"  Clinical Pharmacy Appointment  Subjective     Patient ID: Iraj Benavides is a 58 y.o. male who presents for Diabetes.    Referring Provider: Jairon Evans,      Diabetes  He presents for his follow-up diabetic visit. He has type 2 diabetes mellitus. His disease course has been worsening.     Exercise: active at work and \"side jobs\" involve heavy lifting      Diet:   24 hr recall:   Hard shelled tacos - 4 beef   Blueberries   Soup - Chicken Noodle   2-3 peanut butter cookies   [employer] Cheese toast sandwich    `   Weight: 166 lbs - pt reported     Allergies   Allergen Reactions    Penicillins Unknown       Objective     Current DM Pharmacotherapy:   Trulicity 4.5 mg/0.5 mL - once weekly (Mondays)  Metformin 1000 mg - 1 tablet twice daily    Clarifications to above regimen: Has taken 4 doses of Trulicity 4.5   Adverse Effects: None     Previous DM Pharmacotherapy:   Farxiga - UTI   Rybelsus- UTI and feels this was the cause     SECONDARY PREVENTION  - Statin? No, myalgias on Crestor  - ACEi/ARB? No  - Aspirin? Yes    Current monitoring regimen:   Patient is using: glucometer    Testing frequency: daily     SMBG Readings: 320 mg/dL this morning - ate last at 10 PM - Hard shell tacos     FBG lowest 209 mg/dL and highest 320 mg/dL - generally in ~200s     Any episodes of hypoglycemia? No.  Did patient treat episode of hypoglycemia appropriately? N/A    Pertinent PMH Review:  - PMH of Pancreatitis: No  - PMH/FH of Medullary Thyroid Cancer: No  - PMH/FH of Multiple Endocrine Neoplasia (MEN) Type II: No  - PMH of Retinopathy: No  - PMH of Urinary Tract Infections/Yeast Infections: Yes    Lab Review  BMP  Lab Results   Component Value Date    CALCIUM 9.8 06/29/2024     06/29/2024    K 4.0 06/29/2024    CO2 27 06/29/2024     06/29/2024    BUN 18 06/29/2024    CREATININE 1.37 (H) 06/29/2024    EGFR 59 (L) 06/29/2024     LFTs  Lab Results   Component Value Date    ALT 18 06/29/2024    AST 19 06/29/2024    " ALKPHOS 49 06/29/2024    BILITOT 0.8 06/29/2024     FLP  Lab Results   Component Value Date    TRIG 94 02/15/2024    CHOL 165 02/15/2024    LDLF 134 (H) 03/24/2023    LDLCALC 103 (H) 02/15/2024    HDL 43.1 02/15/2024       The 10-year ASCVD risk score (Salena FERNANDEZ, et al., 2019) is: 13.4%    Values used to calculate the score:      Age: 58 years      Sex: Male      Is Non- : Yes      Diabetic: Yes      Tobacco smoker: No      Systolic Blood Pressure: 123 mmHg      Is BP treated: No      HDL Cholesterol: 43.1 mg/dL      Total Cholesterol: 165 mg/dL  Urine Microalbumin  Lab Results   Component Value Date    MICROALBCREA  02/15/2024      Comment:      One or more analytes used in this calculation is outside of the analytical measurement range. Calculation cannot be performed.     Weight Management  Wt Readings from Last 3 Encounters:   11/07/24 78.5 kg (173 lb)   09/03/24 81.6 kg (180 lb)   06/10/24 84.2 kg (185 lb 9.6 oz)      There is no height or weight on file to calculate BMI.   A1C  Lab Results   Component Value Date    HGBA1C 8.9 (H) 09/03/2024    HGBA1C 7.0 (H) 06/29/2024    HGBA1C 8.8 (H) 02/15/2024     Assessment/Plan     Problem List Items Addressed This Visit       Type 2 diabetes mellitus without complication, without long-term current use of insulin (Multi)     Home BG remain significantly elevated despite escalation in Trulicity dose. Confirmed adherence to DM regimen. -320 mg/dL. Patient reports a generally healthy diet overall but does admit to a lot of pies and cookies since Thanksgiving.     Patient has hx of UTI on Farxiga. No other previous DM therapy. Prefers oral medications. Discussed that insulin will become necessary if BG does not improve.     START Glipizide ER 5 mg - once daily with first main meal of the day     CONTINUE   Trulicity 4.5 mg/0.5 mL - once weekly (Mondays)  Metformin 1000 mg - 1 tablet twice daily    Will attempt PA for Kori 3 Plus for better  glucose monitoring            Type 2 diabetes mellitus, is not at goal. Goal A1C: <7%    Follow up: I recommend diabetes care be 2 weeks.    Marla FernandezD [facility]  Clinical Pharmacy Specialist, Primary Care     Continue all meds under the continuation of care with the referring provider and clinical pharmacy team

## 2024-12-03 NOTE — PROGRESS NOTES
Subjective   Patient ID: Iraj Benavides is a 58 y.o. male who presents for Follow-up (Patient here for follow-up visit ).    HPI     Patient is a 58-year-old male with past medical history of hypertension diabetes mellitus type 2, dyslipidemia who presents for sick visit    Patient complaining of a rash on the glans penis.  He was initially started with Diflucan for presumed candidal balanitis.  No significant changes in his symptoms.  Still present.  He does have uncontrolled diabetes and recently started on glipizide but has not started it as of yet.      Review of Systems  Constitutional: No fever or chills  Cardiovascular: no chest pain, no palpitations and no syncope.   Respiratory: no cough, no shortness of breath during exertion and no shortness of breath at rest.   Gastrointestinal: no abdominal pain, no nausea and no vomiting.  Neuro: No Headache, no dizziness    Objective   /77   Pulse 80     Physical Exam  Constitutional: Alert and in no acute distress. Well developed, well nourished  Head and Face: Head and face: Normal.    Cardiovascular: Heart rate and rhythm were normal, normal S1 and S2. No peripheral edema.   Pulmonary: No respiratory distress. Clear bilateral breath sounds.  Musculoskeletal: Paraspinal musculature of the C-spine tender to palpation with restricted range of motion  Skin: Erythema on the glans penis without swelling  Psychiatric: Judgment and insight: Intact. Mood and affect: Normal.    Procedures    Lab Results   Component Value Date    WBC 6.9 10/29/2023    HGB 15.0 10/29/2023    HCT 46.6 10/29/2023     10/29/2023    CHOL 165 02/15/2024    TRIG 94 02/15/2024    HDL 43.1 02/15/2024    LDLDIRECT 189 (H) 05/20/2021    ALT 18 06/29/2024    AST 19 06/29/2024     06/29/2024    K 4.0 06/29/2024     06/29/2024    CREATININE 1.37 (H) 06/29/2024    BUN 18 06/29/2024    CO2 27 06/29/2024    HGBA1C 8.9 (H) 09/03/2024       CT abdomen pelvis wo IV contrast  Narrative:  Interpreted By:  Jorge Luis Newby and Baker Zachary   STUDY:  CT ABDOMEN PELVIS WO IV CONTRAST;  10/29/2023 1:30 pm      INDICATION:  Signs/Symptoms:lower back pain.      COMPARISON:  None.      ACCESSION NUMBER(S):  [id number]      ORDERING CLINICIAN:  LISA OCHOA      TECHNIQUE:  Contiguous axial images of the abdomen and pelvis were obtained  without contrast. Coronal and sagittal reformatted images were  reconstructed from the axial data.      FINDINGS:  LOWER CHEST: Minimal bibasilar atelectasis. Otherwise no acute  abnormality.          ABDOMEN/PELVIS:      ABDOMINAL WALL: No significant abnormality.      LIVER: No significant parenchymal abnormality.      BILE DUCTS: No significant intrahepatic or extrahepatic dilatation.      GALLBLADDER: No significant abnormality.      PANCREAS: No significant abnormality.      SPLEEN: No significant abnormality.      ADRENALS: No significant abnormality.      KIDNEYS, URETERS, BLADDER: No significant abnormality.      REPRODUCTIVE ORGANS: No significant abnormality.      VESSELS: No significant abnormality.      RETROPERITONEUM/LYMPH NODES: No enlarged lymph nodes. No acute  retroperitoneal abnormality.      BOWEL/MESENTERY/PERITONEUM: No inflammatory bowel wall thickening or  dilatation. Colonic diverticulosis without evidence of  diverticulitis. Normal appendix.      No ascites, free air, or fluid collection.          MUSCULOSKELETAL: No acute osseous abnormality. Benign appearing  fibro-osseous lytic lesion in the left iliac body measuring 2.9 cm is  noted. There is a dense 0.7 cm sclerotic lesion right iliac body  likely representing a bone island. Subchondral cystic change in the  posterior ileal side of the right sacroiliac joint is noted. Moderate  multilevel degenerative changes of the lower lumbar spine, most  pronounced at L5-S1. Notably, there is high-grade bilateral foraminal  stenosis at L5-S1 (right > left). There is also moderate bilateral  L4-L5  foraminal stenosis. There is no high-grade canal stenosis.      Impression: 1. No acute abnormality of the abdomen or pelvis.  2. Moderate multilevel degenerative changes of the lower lumbar  spine, most pronounced at L5-S1, notable for high-grade bilateral  foraminal stenosis (right > left).  3. Colonic diverticulosis without evidence of diverticulitis.          I personally reviewed the images/study and I agree with the findings  as stated. This study was interpreted at [place], [place].      MACRO:  None.      Signed by: Jorge Luis Newby 10/29/2023 2:05 PM  Dictation workstation:   [id number]            Assessment/Plan   Problem List Items Addressed This Visit             ICD-10-CM    Type 2 diabetes mellitus without complication, without long-term current use of insulin (Multi) E11.9    Relevant Medications    Dexcom G7 Sensor device    FreeStyle Kori 3 Sensor device    Other Relevant Orders    Referral to Nutrition Services    Follow Up In [facility] - PCP - Established     Other Visit Diagnoses         Codes    Dermatitis    -  Primary L30.9    Relevant Medications    triamcinolone (Kenalog) 0.1 % ointment          Dermatitis of the glans penis.  Start triamcinolone twice daily.  If no improvement in 7 to 14 days or symptoms are getting worse will refer to urology.  Continue with diabetic care.  Add glipizide.  Check A1c 3 months.  Will order continuous glucose monitor for better evaluation.  Referral to nutritionist to discuss low carbohydrate diet

## 2024-12-07 PROCEDURE — RXMED WILLOW AMBULATORY MEDICATION CHARGE

## 2024-12-12 ENCOUNTER — PHARMACY VISIT (OUTPATIENT)
Dept: PHARMACY | Facility: CLINIC | Age: 59
End: 2024-12-12
Payer: COMMERCIAL

## 2024-12-17 ENCOUNTER — APPOINTMENT (OUTPATIENT)
Dept: PHARMACY | Facility: HOSPITAL | Age: 59
End: 2024-12-17
Payer: COMMERCIAL

## 2024-12-17 DIAGNOSIS — E11.9 TYPE 2 DIABETES MELLITUS WITHOUT COMPLICATION, WITHOUT LONG-TERM CURRENT USE OF INSULIN (MULTI): ICD-10-CM

## 2024-12-17 RX ORDER — BLOOD-GLUCOSE SENSOR
EACH MISCELLANEOUS
Qty: 2 EACH | Refills: 11 | Status: SHIPPED | OUTPATIENT
Start: 2024-12-17

## 2024-12-17 RX ORDER — GLIPIZIDE 10 MG/1
10 TABLET, FILM COATED, EXTENDED RELEASE ORAL DAILY
Qty: 30 TABLET | Refills: 2 | Status: SHIPPED | OUTPATIENT
Start: 2024-12-17

## 2024-12-17 NOTE — ASSESSMENT & PLAN NOTE
Home BG improved (no FBG in 300s) with initiation of Glipizide. Discussed dietary recommendations (no juice).     INCREASE to Glipizide ER 10 mg - 1 tablet daily     CONTINUE   Trulicity 4.5 mg/0.5 mL - once weekly (Mondays)  Metformin 1000 mg - 1 tablet twice daily    Will send Kori 3 plus to [facility] for PA support.

## 2024-12-17 NOTE — PROGRESS NOTES
"  Clinical Pharmacy Appointment  Subjective     Patient ID: Iraj Benavides is a 58 y.o. male who presents for Diabetes.    Referring Provider: Jairon Evans,      Diabetes  He presents for his follow-up diabetic visit. He has type 2 diabetes mellitus. His disease course has been worsening.     Exercise: active at work and \"side jobs\" involve heavy lifting      Diet:   24 hr recall:   Roast Beef and Potatoes and Carrot   [employer]- Ham on wheat - 6 inch   Bowl of Cheerios with milk   Blueberries and Banana   Caramel Popcorn     Juice and egg Mcmuffin sandwich this morning    `   Weight: 166 lbs - pt reported     Allergies   Allergen Reactions    Penicillins Unknown       Objective     Current DM Pharmacotherapy:   Trulicity 4.5 mg/0.5 mL - once weekly (Mondays)  Metformin 1000 mg - 1 tablet twice daily  Glipizide ER 5 mg - 1 tablet daily     Clarifications to above regimen: None  Adverse Effects: None     Previous DM Pharmacotherapy:   Farxiga - UTI   Rybelsus- UTI and feels this was the cause     SECONDARY PREVENTION  - Statin? No, myalgias on Crestor  - ACEi/ARB? No  - Aspirin? Yes    Current monitoring regimen:   Patient is using: glucometer    Testing frequency: daily     SMBG Readings: 200-210 mg/dL (FBG)    Any episodes of hypoglycemia? No.  Did patient treat episode of hypoglycemia appropriately? N/A    Pertinent PMH Review:  - PMH of Pancreatitis: No  - PMH/FH of Medullary Thyroid Cancer: No  - PMH/FH of Multiple Endocrine Neoplasia (MEN) Type II: No  - PMH of Retinopathy: No  - PMH of Urinary Tract Infections/Yeast Infections: Yes    Lab Review  BMP  Lab Results   Component Value Date    CALCIUM 9.8 06/29/2024     06/29/2024    K 4.0 06/29/2024    CO2 27 06/29/2024     06/29/2024    BUN 18 06/29/2024    CREATININE 1.37 (H) 06/29/2024    EGFR 59 (L) 06/29/2024     LFTs  Lab Results   Component Value Date    ALT 18 06/29/2024    AST 19 06/29/2024    ALKPHOS 49 06/29/2024    BILITOT 0.8 06/29/2024 "     FLP  Lab Results   Component Value Date    TRIG 94 02/15/2024    CHOL 165 02/15/2024    LDLF 134 (H) 03/24/2023    LDLCALC 103 (H) 02/15/2024    HDL 43.1 02/15/2024       The 10-year ASCVD risk score (Salena FERNANDEZ, et al., 2019) is: 10.1%    Values used to calculate the score:      Age: 58 years      Sex: Male      Is Non- : Yes      Diabetic: Yes      Tobacco smoker: No      Systolic Blood Pressure: 104 mmHg      Is BP treated: No      HDL Cholesterol: 43.1 mg/dL      Total Cholesterol: 165 mg/dL  Urine Microalbumin  Lab Results   Component Value Date    MICROALBCREA  02/15/2024      Comment:      One or more analytes used in this calculation is outside of the analytical measurement range. Calculation cannot be performed.     Weight Management  Wt Readings from Last 3 Encounters:   11/07/24 78.5 kg (173 lb)   09/03/24 81.6 kg (180 lb)   06/10/24 84.2 kg (185 lb 9.6 oz)      There is no height or weight on file to calculate BMI.   A1C  Lab Results   Component Value Date    HGBA1C 8.9 (H) 09/03/2024    HGBA1C 7.0 (H) 06/29/2024    HGBA1C 8.8 (H) 02/15/2024     Assessment/Plan     Problem List Items Addressed This Visit       Type 2 diabetes mellitus without complication, without long-term current use of insulin (Multi)     Home BG improved (no FBG in 300s) with initiation of Glipizide. Discussed dietary recommendations (no juice).     INCREASE to Glipizide ER 10 mg - 1 tablet daily     CONTINUE   Trulicity 4.5 mg/0.5 mL - once weekly (Mondays)  Metformin 1000 mg - 1 tablet twice daily    Will send Kori 3 plus to [employer] for PA support.               Type 2 diabetes mellitus, is not at goal. Goal A1C: <7%    Follow up: I recommend diabetes care be 3 weeks.    Marla Martinez PharmD [facility]  Clinical Pharmacy Specialist, Primary Care     Continue all meds under the continuation of care with the referring provider and clinical pharmacy team

## 2024-12-18 ENCOUNTER — PATIENT OUTREACH (OUTPATIENT)
Dept: CARE COORDINATION | Facility: CLINIC | Age: 59
End: 2024-12-18
Payer: COMMERCIAL

## 2024-12-18 ENCOUNTER — LAB (OUTPATIENT)
Dept: LAB | Facility: LAB | Age: 59
End: 2024-12-18
Payer: COMMERCIAL

## 2024-12-18 DIAGNOSIS — E11.9 TYPE 2 DIABETES MELLITUS WITHOUT COMPLICATION, WITHOUT LONG-TERM CURRENT USE OF INSULIN (MULTI): ICD-10-CM

## 2024-12-18 PROCEDURE — 83036 HEMOGLOBIN GLYCOSYLATED A1C: CPT

## 2024-12-18 PROCEDURE — 36415 COLL VENOUS BLD VENIPUNCTURE: CPT

## 2024-12-18 PROCEDURE — 85027 COMPLETE CBC AUTOMATED: CPT

## 2024-12-18 PROCEDURE — 80069 RENAL FUNCTION PANEL: CPT

## 2024-12-18 NOTE — PROGRESS NOTES
Left message, on preferred telephone number, requesting return call to set up an appointment for diabetes self management education. Will try again later if no return call.

## 2024-12-19 DIAGNOSIS — E11.9 TYPE 2 DIABETES MELLITUS WITHOUT COMPLICATION, WITHOUT LONG-TERM CURRENT USE OF INSULIN (MULTI): Primary | ICD-10-CM

## 2024-12-19 LAB
ALBUMIN SERPL BCP-MCNC: 4.3 G/DL (ref 3.4–5)
ANION GAP SERPL CALC-SCNC: 12 MMOL/L (ref 10–20)
BUN SERPL-MCNC: 16 MG/DL (ref 6–23)
CALCIUM SERPL-MCNC: 10 MG/DL (ref 8.6–10.6)
CHLORIDE SERPL-SCNC: 104 MMOL/L (ref 98–107)
CO2 SERPL-SCNC: 28 MMOL/L (ref 21–32)
CREAT SERPL-MCNC: 0.92 MG/DL (ref 0.5–1.3)
EGFRCR SERPLBLD CKD-EPI 2021: >90 ML/MIN/1.73M*2
ERYTHROCYTE [DISTWIDTH] IN BLOOD BY AUTOMATED COUNT: 12.1 % (ref 11.5–14.5)
EST. AVERAGE GLUCOSE BLD GHB EST-MCNC: 223 MG/DL
GLUCOSE SERPL-MCNC: 188 MG/DL (ref 74–99)
HBA1C MFR BLD: 9.4 %
HCT VFR BLD AUTO: 44.5 % (ref 41–52)
HGB BLD-MCNC: 13.8 G/DL (ref 13.5–17.5)
MCH RBC QN AUTO: 27.3 PG (ref 26–34)
MCHC RBC AUTO-ENTMCNC: 31 G/DL (ref 32–36)
MCV RBC AUTO: 88 FL (ref 80–100)
NRBC BLD-RTO: 0 /100 WBCS (ref 0–0)
PHOSPHATE SERPL-MCNC: 3.2 MG/DL (ref 2.5–4.9)
PLATELET # BLD AUTO: 239 X10*3/UL (ref 150–450)
POTASSIUM SERPL-SCNC: 4.3 MMOL/L (ref 3.5–5.3)
RBC # BLD AUTO: 5.05 X10*6/UL (ref 4.5–5.9)
SODIUM SERPL-SCNC: 140 MMOL/L (ref 136–145)
WBC # BLD AUTO: 6.7 X10*3/UL (ref 4.4–11.3)

## 2024-12-19 RX ORDER — INSULIN GLARGINE 100 [IU]/ML
10 INJECTION, SOLUTION SUBCUTANEOUS NIGHTLY
Qty: 3 ML | Refills: 1 | Status: SHIPPED | OUTPATIENT
Start: 2024-12-19 | End: 2025-02-17

## 2025-01-03 PROCEDURE — RXMED WILLOW AMBULATORY MEDICATION CHARGE

## 2025-01-08 ENCOUNTER — APPOINTMENT (OUTPATIENT)
Dept: PHARMACY | Facility: HOSPITAL | Age: 60
End: 2025-01-08
Payer: COMMERCIAL

## 2025-01-08 DIAGNOSIS — E11.9 TYPE 2 DIABETES MELLITUS WITHOUT COMPLICATION, WITHOUT LONG-TERM CURRENT USE OF INSULIN (MULTI): ICD-10-CM

## 2025-01-08 NOTE — ASSESSMENT & PLAN NOTE
Patient's goal A1c is < 7%.  Is pt at goal? No, 9.4%  Patient's SMBGs are improved- 140-150 mg/dL on average over past week (FBG).    Rationale for plan: Home BG improving since holidays, no NOY, will continue with lifestyle modifications at this time.    Medication Changes:  CONTINUE  Trulicity 4.5 mg/0.5 mL - once weekly (Mondays)  Metformin 1000 mg - 1 tablet twice daily  Glipizide ER 10 mg - 1 tablet daily     Future Considerations:  Can consider trial of Jardiance or addition of pioglitazone if needed     Monitoring and Education:  Pt to continue monitoring FBG   Pt has appointment with nutrition on 1/20- discussed again today adding lean proteins and vegetables to meals- limiting carbohydrates

## 2025-01-08 NOTE — PROGRESS NOTES
"  Clinical Pharmacy Appointment  Subjective     Patient ID: Iraj Benavides is a 59 y.o. male who presents for Diabetes.    Referring Provider: Jairon Evans DO     Diabetes  He presents for his follow-up diabetic visit. He has type 2 diabetes mellitus. His disease course has been worsening.     Exercise: active at work and \"side jobs\" involve heavy lifting      Diet:   24 hr recall:   Breakfast sandwich   Large tater tots  Banana   Broccoli  Blueberries   Peanut butter and jelly sandwich   Bowl of cheerios     Now drinking warm water before bed    `   Weight: 176 lbs - pt reported     Allergies   Allergen Reactions    Penicillins Unknown       Objective     Current DM Pharmacotherapy:   Trulicity 4.5 mg/0.5 mL - once weekly (Mondays)  Metformin 1000 mg - 1 tablet twice daily  Glipizide ER 10 mg - 1 tablet daily     Clarifications to above regimen: None  Adverse Effects: None     Previous DM Pharmacotherapy:   Farxiga - UTI   Rybelsus- UTI and feels this was the cause     SECONDARY PREVENTION  - Statin? No, myalgias on Crestor  - ACEi/ARB? No  - Aspirin? Yes    Current monitoring regimen:   Patient is using: glucometer    Testing frequency: daily     SMBG Readings: 140-150 mg/dL (FBG)    Any episodes of hypoglycemia? No.  Did patient treat episode of hypoglycemia appropriately? N/A    Pertinent PMH Review:  - PMH of Pancreatitis: No  - PMH/FH of Medullary Thyroid Cancer: No  - PMH/FH of Multiple Endocrine Neoplasia (MEN) Type II: No  - PMH of Retinopathy: No  - PMH of Urinary Tract Infections/Yeast Infections: Yes    Lab Review  BMP  Lab Results   Component Value Date    CALCIUM 10.0 12/18/2024     12/18/2024    K 4.3 12/18/2024    CO2 28 12/18/2024     12/18/2024    BUN 16 12/18/2024    CREATININE 0.92 12/18/2024    EGFR >90 12/18/2024     LFTs  Lab Results   Component Value Date    ALT 18 06/29/2024    AST 19 06/29/2024    ALKPHOS 49 06/29/2024    BILITOT 0.8 06/29/2024     FLP  Lab Results   Component " Value Date    TRIG 94 02/15/2024    CHOL 165 02/15/2024    LDLF 134 (H) 03/24/2023    LDLCALC 103 (H) 02/15/2024    HDL 43.1 02/15/2024       The 10-year ASCVD risk score (Salena FERNANDEZ, et al., 2019) is: 10.5%    Values used to calculate the score:      Age: 59 years      Sex: Male      Is Non- : Yes      Diabetic: Yes      Tobacco smoker: No      Systolic Blood Pressure: 104 mmHg      Is BP treated: No      HDL Cholesterol: 43.1 mg/dL      Total Cholesterol: 165 mg/dL  Urine Microalbumin  Lab Results   Component Value Date    MICROALBCREA  02/15/2024      Comment:      One or more analytes used in this calculation is outside of the analytical measurement range. Calculation cannot be performed.     Weight Management  Wt Readings from Last 3 Encounters:   11/07/24 78.5 kg (173 lb)   09/03/24 81.6 kg (180 lb)   06/10/24 84.2 kg (185 lb 9.6 oz)      There is no height or weight on file to calculate BMI.   A1C  Lab Results   Component Value Date    HGBA1C 9.4 (H) 12/18/2024    HGBA1C 8.9 (H) 09/03/2024    HGBA1C 7.0 (H) 06/29/2024     Assessment/Plan     Problem List Items Addressed This Visit       Type 2 diabetes mellitus without complication, without long-term current use of insulin (Multi)     Patient's goal A1c is < 7%.  Is pt at goal? No, 9.4%  Patient's SMBGs are improved- 140-150 mg/dL on average over past week (FBG).    Rationale for plan: Home BG improving since holidays, no NOY, will continue with lifestyle modifications at this time.    Medication Changes:  CONTINUE  Trulicity 4.5 mg/0.5 mL - once weekly (Mondays)  Metformin 1000 mg - 1 tablet twice daily  Glipizide ER 10 mg - 1 tablet daily     Future Considerations:  Can consider trial of Jardiance or addition of pioglitazone if needed     Monitoring and Education:  Pt to continue monitoring FBG   Pt has appointment with nutrition on 1/20- discussed again today adding lean proteins and vegetables to meals- limiting carbohydrates              Follow up: I recommend diabetes care be 4 weeks.    Marla FernandezD [facility]  Clinical Pharmacy Specialist, Primary Care     Continue all meds under the continuation of care with the referring provider and clinical pharmacy team

## 2025-01-15 ENCOUNTER — PHARMACY VISIT (OUTPATIENT)
Dept: PHARMACY | Facility: CLINIC | Age: 60
End: 2025-01-15
Payer: COMMERCIAL

## 2025-01-21 DIAGNOSIS — E11.9 TYPE 2 DIABETES MELLITUS WITHOUT COMPLICATION, WITHOUT LONG-TERM CURRENT USE OF INSULIN (MULTI): ICD-10-CM

## 2025-01-21 RX ORDER — METFORMIN HYDROCHLORIDE 1000 MG/1
1000 TABLET ORAL
Qty: 180 TABLET | Refills: 1 | Status: SHIPPED | OUTPATIENT
Start: 2025-01-21

## 2025-02-05 ENCOUNTER — APPOINTMENT (OUTPATIENT)
Dept: PHARMACY | Facility: HOSPITAL | Age: 60
End: 2025-02-05
Payer: COMMERCIAL

## 2025-02-05 DIAGNOSIS — E11.9 TYPE 2 DIABETES MELLITUS WITHOUT COMPLICATION, WITHOUT LONG-TERM CURRENT USE OF INSULIN (MULTI): ICD-10-CM

## 2025-02-05 PROCEDURE — RXMED WILLOW AMBULATORY MEDICATION CHARGE

## 2025-02-05 NOTE — PROGRESS NOTES
Clinical Pharmacy Appointment  Subjective     Patient ID: Iraj Benavides is a 59 y.o. male who presents for Diabetes.    Referring Provider: Jairon Evans DO     Diabetes  He presents for his follow-up diabetic visit. He has type 2 diabetes mellitus. His disease course has been worsening.     Exercise: active at work      Diet: generally healthy   `   Weight: 176 lbs - pt reported - stable    Allergies   Allergen Reactions    Penicillins Unknown       Objective     Current DM Pharmacotherapy:   Trulicity 4.5 mg/0.5 mL - once weekly (Mondays)  Metformin 1000 mg - 1 tablet twice daily  Glipizide ER 10 mg - 1 tablet daily     Clarifications to above regimen: None  Adverse Effects: None     Previous DM Pharmacotherapy:   Farxiga - UTI   Rybelsus- UTI and feels this was the cause     SECONDARY PREVENTION  - Statin? No, myalgias on Crestor  - ACEi/ARB? No  - Aspirin? Yes    Current monitoring regimen:   Patient is using: glucometer    Testing frequency: daily     SMBG Readings: 124-144 mg/dL (FBG)    Any episodes of hypoglycemia? No.  Did patient treat episode of hypoglycemia appropriately? N/A    Pertinent PMH Review:  - PMH of Pancreatitis: No  - PMH/FH of Medullary Thyroid Cancer: No  - PMH/FH of Multiple Endocrine Neoplasia (MEN) Type II: No  - PMH of Retinopathy: No  - PMH of Urinary Tract Infections/Yeast Infections: Yes    Lab Review  BMP  Lab Results   Component Value Date    CALCIUM 10.0 12/18/2024     12/18/2024    K 4.3 12/18/2024    CO2 28 12/18/2024     12/18/2024    BUN 16 12/18/2024    CREATININE 0.92 12/18/2024    EGFR >90 12/18/2024     LFTs  Lab Results   Component Value Date    ALT 18 06/29/2024    AST 19 06/29/2024    ALKPHOS 49 06/29/2024    BILITOT 0.8 06/29/2024     FLP  Lab Results   Component Value Date    TRIG 94 02/15/2024    CHOL 165 02/15/2024    LDLF 134 (H) 03/24/2023    LDLCALC 103 (H) 02/15/2024    HDL 43.1 02/15/2024       The 10-year ASCVD risk score (Salena FERNANDEZ, et al.,  2019) is: 10.5%    Values used to calculate the score:      Age: 59 years      Sex: Male      Is Non- : Yes      Diabetic: Yes      Tobacco smoker: No      Systolic Blood Pressure: 104 mmHg      Is BP treated: No      HDL Cholesterol: 43.1 mg/dL      Total Cholesterol: 165 mg/dL  Urine Microalbumin  Lab Results   Component Value Date    MICROALBCREA  02/15/2024      Comment:      One or more analytes used in this calculation is outside of the analytical measurement range. Calculation cannot be performed.     Weight Management  Wt Readings from Last 3 Encounters:   11/07/24 78.5 kg (173 lb)   09/03/24 81.6 kg (180 lb)   06/10/24 84.2 kg (185 lb 9.6 oz)      There is no height or weight on file to calculate BMI.   A1C  Lab Results   Component Value Date    HGBA1C 9.4 (H) 12/18/2024    HGBA1C 8.9 (H) 09/03/2024    HGBA1C 7.0 (H) 06/29/2024     Assessment/Plan     Problem List Items Addressed This Visit       Type 2 diabetes mellitus without complication, without long-term current use of insulin (Multi)     Patient's goal A1c is < 7%.  Is pt at goal? No, 9.4%  Patient's SMBGs are generally at goal.     Rationale for plan: Patient reports BG well controlled at home, no NOY.    Medication Changes:  CONTINUE  Trulicity 4.5 mg/0.5 mL - once weekly (Mondays)  Metformin 1000 mg - 1 tablet twice daily  Glipizide ER 10 mg - 1 tablet daily     Future Considerations:  Can consider trial of Jardiance or addition of pioglitazone if needed     Monitoring and Education:  A1C due mid March              Follow up: I recommend diabetes care be 6-12 weeks.    Marla Martinez PharmD, BCACP  Clinical Pharmacy Specialist, Primary Care       Continue all meds under the continuation of care with the referring provider and clinical pharmacy team

## 2025-02-05 NOTE — ASSESSMENT & PLAN NOTE
Patient's goal A1c is < 7%.  Is pt at goal? No, 9.4%  Patient's SMBGs are generally at goal.     Rationale for plan: Patient reports BG well controlled at home, no NOY.    Medication Changes:  CONTINUE  Trulicity 4.5 mg/0.5 mL - once weekly (Mondays)  Metformin 1000 mg - 1 tablet twice daily  Glipizide ER 10 mg - 1 tablet daily     Future Considerations:  Can consider trial of Jardiance or addition of pioglitazone if needed     Monitoring and Education:  A1C due mid March

## 2025-02-13 ENCOUNTER — PHARMACY VISIT (OUTPATIENT)
Dept: PHARMACY | Facility: CLINIC | Age: 60
End: 2025-02-13
Payer: COMMERCIAL

## 2025-03-03 ENCOUNTER — APPOINTMENT (OUTPATIENT)
Dept: PRIMARY CARE | Facility: CLINIC | Age: 60
End: 2025-03-03
Payer: COMMERCIAL

## 2025-03-03 VITALS
DIASTOLIC BLOOD PRESSURE: 80 MMHG | WEIGHT: 188 LBS | SYSTOLIC BLOOD PRESSURE: 118 MMHG | OXYGEN SATURATION: 95 % | HEART RATE: 90 BPM | BODY MASS INDEX: 29.01 KG/M2

## 2025-03-03 DIAGNOSIS — Z23 NEED FOR SHINGLES VACCINE: Primary | ICD-10-CM

## 2025-03-03 DIAGNOSIS — E11.9 TYPE 2 DIABETES MELLITUS WITHOUT COMPLICATION, WITHOUT LONG-TERM CURRENT USE OF INSULIN (MULTI): ICD-10-CM

## 2025-03-03 PROCEDURE — 90471 IMMUNIZATION ADMIN: CPT | Performed by: INTERNAL MEDICINE

## 2025-03-03 PROCEDURE — 99213 OFFICE O/P EST LOW 20 MIN: CPT | Performed by: INTERNAL MEDICINE

## 2025-03-03 PROCEDURE — 1036F TOBACCO NON-USER: CPT | Performed by: INTERNAL MEDICINE

## 2025-03-03 PROCEDURE — 3074F SYST BP LT 130 MM HG: CPT | Performed by: INTERNAL MEDICINE

## 2025-03-03 PROCEDURE — G8433 SCR FOR DEP NOT CPT DOC RSN: HCPCS | Performed by: INTERNAL MEDICINE

## 2025-03-03 PROCEDURE — 3079F DIAST BP 80-89 MM HG: CPT | Performed by: INTERNAL MEDICINE

## 2025-03-03 PROCEDURE — 90750 HZV VACC RECOMBINANT IM: CPT | Performed by: INTERNAL MEDICINE

## 2025-03-03 RX ORDER — GLIPIZIDE 10 MG/1
10 TABLET, FILM COATED, EXTENDED RELEASE ORAL DAILY
Qty: 90 TABLET | Refills: 1 | Status: SHIPPED | OUTPATIENT
Start: 2025-03-03

## 2025-03-03 RX ORDER — METFORMIN HYDROCHLORIDE 1000 MG/1
1000 TABLET ORAL
Qty: 180 TABLET | Refills: 1 | Status: SHIPPED | OUTPATIENT
Start: 2025-03-03

## 2025-03-03 ASSESSMENT — PATIENT HEALTH QUESTIONNAIRE - PHQ9
2. FEELING DOWN, DEPRESSED OR HOPELESS: NOT AT ALL
SUM OF ALL RESPONSES TO PHQ9 QUESTIONS 1 AND 2: 0
1. LITTLE INTEREST OR PLEASURE IN DOING THINGS: NOT AT ALL

## 2025-03-03 NOTE — PROGRESS NOTES
Subjective   Patient ID: Iraj Benavieds is a 59 y.o. male who presents for Follow-up.    HPI     Patient is a 59-year-old male with past medical history of hypertension diabetes mellitus type 2, dyslipidemia who presents for follow-up    Patient with diabetes mellitus type 2 with A1c has been above goal.  Last A1c greater than 9.  No increased thirst or increased urination.  He states that his sugars have been better controlled on the higher dose glipizide metformin and Trulicity.  Due for A1c check.  He did not complete the A1c ordered no increased thirst or increased urination.  No numbness or tingling of the extremities.        Review of Systems  Constitutional: No fever or chills  Cardiovascular: no chest pain, no palpitations and no syncope.   Respiratory: no cough, no shortness of breath during exertion and no shortness of breath at rest.   Gastrointestinal: no abdominal pain, no nausea and no vomiting.  Neuro: No Headache, no dizziness    Objective   /80   Pulse 90   Wt 85.3 kg (188 lb)   SpO2 95%   BMI 29.01 kg/m²     Physical Exam  Constitutional: Alert and in no acute distress. Well developed, well nourished  Head and Face: Head and face: Normal.    Cardiovascular: Heart rate and rhythm were normal, normal S1 and S2. No peripheral edema.   Pulmonary: No respiratory distress. Clear bilateral breath sounds.  Musculoskeletal: Paraspinal musculature of the C-spine tender to palpation with restricted range of motion  Skin: Erythema on the glans penis without swelling  Psychiatric: Judgment and insight: Intact. Mood and affect: Normal.    Procedures    Lab Results   Component Value Date    WBC 6.7 12/18/2024    HGB 13.8 12/18/2024    HCT 44.5 12/18/2024     12/18/2024    CHOL 165 02/15/2024    TRIG 94 02/15/2024    HDL 43.1 02/15/2024    LDLDIRECT 189 (H) 05/20/2021    ALT 18 06/29/2024    AST 19 06/29/2024     12/18/2024    K 4.3 12/18/2024     12/18/2024    CREATININE 0.92  12/18/2024    BUN 16 12/18/2024    CO2 28 12/18/2024    HGBA1C 9.4 (H) 12/18/2024       CT abdomen pelvis wo IV contrast  Narrative: Interpreted By:  Jorge Luis Newby and Baker Zachary   STUDY:  CT ABDOMEN PELVIS WO IV CONTRAST;  10/29/2023 1:30 pm      INDICATION:  Signs/Symptoms:lower back pain.      COMPARISON:  None.      ACCESSION NUMBER(S):  [id number]      ORDERING CLINICIAN:  LISA OCHOA      TECHNIQUE:  Contiguous axial images of the abdomen and pelvis were obtained  without contrast. Coronal and sagittal reformatted images were  reconstructed from the axial data.      FINDINGS:  LOWER CHEST: Minimal bibasilar atelectasis. Otherwise no acute  abnormality.          ABDOMEN/PELVIS:      ABDOMINAL WALL: No significant abnormality.      LIVER: No significant parenchymal abnormality.      BILE DUCTS: No significant intrahepatic or extrahepatic dilatation.      GALLBLADDER: No significant abnormality.      PANCREAS: No significant abnormality.      SPLEEN: No significant abnormality.      ADRENALS: No significant abnormality.      KIDNEYS, URETERS, BLADDER: No significant abnormality.      REPRODUCTIVE ORGANS: No significant abnormality.      VESSELS: No significant abnormality.      RETROPERITONEUM/LYMPH NODES: No enlarged lymph nodes. No acute  retroperitoneal abnormality.      BOWEL/MESENTERY/PERITONEUM: No inflammatory bowel wall thickening or  dilatation. Colonic diverticulosis without evidence of  diverticulitis. Normal appendix.      No ascites, free air, or fluid collection.          MUSCULOSKELETAL: No acute osseous abnormality. Benign appearing  fibro-osseous lytic lesion in the left iliac body measuring 2.9 cm is  noted. There is a dense 0.7 cm sclerotic lesion right iliac body  likely representing a bone island. Subchondral cystic change in the  posterior ileal side of the right sacroiliac joint is noted. Moderate  multilevel degenerative changes of the lower lumbar spine, most  pronounced at  L5-S1. Notably, there is high-grade bilateral foraminal  stenosis at L5-S1 (right > left). There is also moderate bilateral  L4-L5 foraminal stenosis. There is no high-grade canal stenosis.      Impression: 1. No acute abnormality of the abdomen or pelvis.  2. Moderate multilevel degenerative changes of the lower lumbar  spine, most pronounced at L5-S1, notable for high-grade bilateral  foraminal stenosis (right > left).  3. Colonic diverticulosis without evidence of diverticulitis.          I personally reviewed the images/study and I agree with the findings  as stated. This study was interpreted at [place], [place].      MACRO:  None.      Signed by: Jorge Luis Newby 10/29/2023 2:05 PM  Dictation workstation:   [id number]            Assessment/Plan   Problem List Items Addressed This Visit             ICD-10-CM    Type 2 diabetes mellitus without complication, without long-term current use of insulin (Multi) E11.9    Relevant Medications    glipiZIDE XL (Glucotrol XL) 10 mg 24 hr tablet    metFORMIN (Glucophage) 1,000 mg tablet    Other Relevant Orders    Hemoglobin A1C    Albumin-Creatinine Ratio, Urine Random    Follow Up In [facility] - PCP - Health Maintenance     Other Visit Diagnoses         Codes    Need for shingles vaccine    -  Primary Z23    Relevant Orders    Zoster vaccine, Recombinant, Adult (SHINGRIX)          Dermatitis of the glans penis.  Start triamcinolone twice daily.  If no improvement in 7 to 14 days or symptoms are getting worse will refer to urology.  Continue with diabetic care.  Add glipizide.  Check A1c 3 months.  Will order continuous glucose monitor for better evaluation.  Referral to nutritionist to discuss low carbohydrate diet

## 2025-03-03 NOTE — ASSESSMENT & PLAN NOTE
Uncontrolled.  Check A1c.  Adjust medications pending results.  Advise low-carb diet.  Advised annual eye exams.  Shingles vaccine today.  Up-to-date on pneumonia vaccine.

## 2025-03-04 LAB
EST. AVERAGE GLUCOSE BLD GHB EST-MCNC: 140 MG/DL
EST. AVERAGE GLUCOSE BLD GHB EST-SCNC: 7.7 MMOL/L
HBA1C MFR BLD: 6.5 % OF TOTAL HGB

## 2025-03-06 LAB
ALBUMIN/CREAT UR: NORMAL MG/G CREAT
CREAT UR-MCNC: 87 MG/DL (ref 20–320)
MICROALBUMIN UR-MCNC: <0.2 MG/DL

## 2025-03-06 PROCEDURE — RXMED WILLOW AMBULATORY MEDICATION CHARGE

## 2025-03-12 ENCOUNTER — PHARMACY VISIT (OUTPATIENT)
Dept: PHARMACY | Facility: CLINIC | Age: 60
End: 2025-03-12
Payer: COMMERCIAL

## 2025-03-17 DIAGNOSIS — E11.9 TYPE 2 DIABETES MELLITUS WITHOUT COMPLICATION, WITHOUT LONG-TERM CURRENT USE OF INSULIN: ICD-10-CM

## 2025-03-31 DIAGNOSIS — E11.9 TYPE 2 DIABETES MELLITUS WITHOUT COMPLICATION, WITHOUT LONG-TERM CURRENT USE OF INSULIN: ICD-10-CM

## 2025-03-31 RX ORDER — BLOOD-GLUCOSE METER
KIT MISCELLANEOUS
Qty: 100 STRIP | Refills: 3 | Status: SHIPPED | OUTPATIENT
Start: 2025-03-31

## 2025-03-31 RX ORDER — LANCETS 28 GAUGE
EACH MISCELLANEOUS
Qty: 100 EACH | Refills: 3 | Status: SHIPPED | OUTPATIENT
Start: 2025-03-31

## 2025-04-02 ENCOUNTER — APPOINTMENT (OUTPATIENT)
Dept: PHARMACY | Facility: HOSPITAL | Age: 60
End: 2025-04-02
Payer: COMMERCIAL

## 2025-04-02 DIAGNOSIS — E11.9 TYPE 2 DIABETES MELLITUS WITHOUT COMPLICATION, WITHOUT LONG-TERM CURRENT USE OF INSULIN: ICD-10-CM

## 2025-04-02 NOTE — PROGRESS NOTES
"  Clinical Pharmacy Appointment  Subjective     Patient ID: Iraj Benavides is a 59 y.o. male who presents for No chief complaint on file..    Referring Provider: Jairon Evans DO     DIABETES MELLITUS TYPE 2:    Does patient follow with Endocrinology: No     Exercise: active at work      Diet: referred to nutritionist by PCP - \"cheating every now and then\" - generally healthy otherwise   `   Weight:   188 lbs 3/3/25 - office (up from previously reported home weight of 176 lbs)    Allergies   Allergen Reactions    Penicillins Unknown       Objective     Current DM Pharmacotherapy:   Trulicity 4.5 mg/0.5 mL - once weekly (Mondays)  Metformin 1000 mg - 1 tablet twice daily  Glipizide ER 10 mg - 1 tablet daily     Clarifications to above regimen: None  Adverse Effects: None     Previous DM Pharmacotherapy:   Farxiga - UTI   Rybelsus- UTI and feels this was the cause     SECONDARY PREVENTION  - Statin? No, myalgias on Crestor  - ACEi/ARB? No  - Aspirin? Yes    Current monitoring regimen:   Patient is using: glucometer    Testing frequency: daily     SMBG Readings: 120-140 mg/dL (FBG) - 130 mg/dL this morning     Any episodes of hypoglycemia? Yes.  Did patient treat episode of hypoglycemia appropriately? Yes, easily treated, 1 incident     Pertinent PMH Review:  - PMH of Pancreatitis: No  - PMH/FH of Medullary Thyroid Cancer: No  - PMH/FH of Multiple Endocrine Neoplasia (MEN) Type II: No  - PMH of Retinopathy: No  - PMH of Urinary Tract Infections/Yeast Infections: Yes    Lab Review  BMP  Lab Results   Component Value Date    CALCIUM 10.0 12/18/2024     12/18/2024    K 4.3 12/18/2024    CO2 28 12/18/2024     12/18/2024    BUN 16 12/18/2024    CREATININE 0.92 12/18/2024    EGFR >90 12/18/2024     LFTs  Lab Results   Component Value Date    ALT 18 06/29/2024    AST 19 06/29/2024    ALKPHOS 49 06/29/2024    BILITOT 0.8 06/29/2024     FLP  Lab Results   Component Value Date    TRIG 94 02/15/2024    CHOL 165 " 02/15/2024    LDLF 134 (H) 03/24/2023    LDLCALC 103 (H) 02/15/2024    HDL 43.1 02/15/2024       The 10-year ASCVD risk score (Salena FERNANDEZ, et al., 2019) is: 13%    Values used to calculate the score:      Age: 59 years      Sex: Male      Is Non- : Yes      Diabetic: Yes      Tobacco smoker: No      Systolic Blood Pressure: 118 mmHg      Is BP treated: No      HDL Cholesterol: 43.1 mg/dL      Total Cholesterol: 165 mg/dL  Urine Microalbumin  Lab Results   Component Value Date    MICROALBCREA NOTE 03/05/2025     Weight Management  Wt Readings from Last 3 Encounters:   03/03/25 85.3 kg (188 lb)   11/07/24 78.5 kg (173 lb)   09/03/24 81.6 kg (180 lb)      There is no height or weight on file to calculate BMI.   A1C  Lab Results   Component Value Date    HGBA1C 6.5 (H) 03/03/2025    HGBA1C 9.4 (H) 12/18/2024    HGBA1C 8.9 (H) 09/03/2024     Assessment/Plan     Problem List Items Addressed This Visit       Type 2 diabetes mellitus without complication, without long-term current use of insulin     Patient's goal A1c is < 7%.  Is pt at goal? Yes, 6.5%  Patient's SMBGs are stable.     Rationale for plan: Stable, no ADEs, working on lifestyle improvements.    Medication Changes:  CONTINUE  Trulicity 4.5 mg/0.5 mL - once weekly (Mondays)  Metformin 1000 mg - 1 tablet twice daily  Glipizide ER 10 mg - 1 tablet daily     Monitoring and Education:  Continue home BG monitoring, confirmed pt has testing supplies (manual)             Follow up: I recommend diabetes care be 12 weeks.    Marla Martinez PharmD, BCACP  Clinical Pharmacy Specialist, Primary Care       Continue all meds under the continuation of care with the referring provider and clinical pharmacy team

## 2025-04-02 NOTE — ASSESSMENT & PLAN NOTE
Patient's goal A1c is < 7%.  Is pt at goal? Yes, 6.5%  Patient's SMBGs are stable.     Rationale for plan: Stable, no ADEs, working on lifestyle improvements.    Medication Changes:  CONTINUE  Trulicity 4.5 mg/0.5 mL - once weekly (Mondays)  Metformin 1000 mg - 1 tablet twice daily  Glipizide ER 10 mg - 1 tablet daily     Monitoring and Education:  Continue home BG monitoring, confirmed pt has testing supplies (manual)

## 2025-04-03 PROCEDURE — RXMED WILLOW AMBULATORY MEDICATION CHARGE

## 2025-04-11 ENCOUNTER — PHARMACY VISIT (OUTPATIENT)
Dept: PHARMACY | Facility: CLINIC | Age: 60
End: 2025-04-11
Payer: COMMERCIAL

## 2025-05-02 PROCEDURE — RXMED WILLOW AMBULATORY MEDICATION CHARGE

## 2025-05-08 ENCOUNTER — PHARMACY VISIT (OUTPATIENT)
Dept: PHARMACY | Facility: CLINIC | Age: 60
End: 2025-05-08
Payer: COMMERCIAL

## 2025-05-20 NOTE — ADDENDUM NOTE
Addended by: DIEGO MONTENEGRO on: 8/18/2023 08:29 AM     Modules accepted: Orders     -  	  62M hx ESRD HD T,Th,S, atrial fibrillation s/p ablation (PVI, CTI, Mitral line, 2/2021), LAAO with Watchman device 8/2024, type A aortic dissection s/p repair 11/2020 c/b ischemic bowel s/p resection, CAD s/p re-op sternotomy CABG x2 and bioprosthetic  AVR/ MV repair, former substance use disorder, MV endocarditis 2023, recent hospitalization 3/18 - 3/21 due to malfunctioning LUE fistula now s/p R sided IR permacath placement, bradycardia now s/p micra PPM 4/25 who now presents to [facility] with c/o dyspnea on exertion. Seen by out patient cardiology team today, sent to hospital for work up elevated BP, dyspnea. Pt reports he was in usual state of health, went to car show yesterday and was feeling sob while walking which is new for him. At this time pt endorsing some dyspnea, and nausea. Pt states he missed dialysis last week, but had two sessions back Pt went to HD yesterday. TTE reveals LVEF 35-40%, mod to severe LVH, bioprosthetic aortic and mitral valves, severe pulm HTN PASP 83 mmHg, IVC dilation with abnormal inspiratory collapse.      Acute systolic heart failure.   - Pt still with dyspnea, denies orthopnea  - no edema on exam today   - TTE with newly reduced EF 35-40%, severe pulm HTN  - CT Chest- Diffuse indistinct groundglass opacities and interlobular septal thickening, likely due to interstitial lung edema. Small bibasilar pleural effusions, relatively unchanged.  - Pro BNP 50K  - Possible missed dialysis last week, states had two days in a row to make up for missed session, can not recall days  - Resumed HD yesterday. Continue fluid removal with HD.   - cont home medications hydralazine, imdur, coreg, torsemide   - Recommend LHC/RHC when stable. Reassess in AM.    HTN   - cont home regimen hydralazine, imdur, coreg, amlodipine  - reduce clonidine 0.1 mg BID given BP is now low s/p HD   - monitor BP.     CAD (coronary artery disease).   - h/o CABG , AVR, MVR 2020  - h/o MV endocarditis 2023  - s/p leadless PPM for bradycardia  - Troponin 117->113, history of elevated troponin, likely demand in setting of ESRD.  - denies chest pain  - cont asa/statin  - Plan for LHC/RHC when stable. Reassess in AM.    Chronic atrial fibrillation.   - s/p watchman procedure  - not on AC  - cont asa  - tele monitoring

## 2025-05-30 PROCEDURE — RXMED WILLOW AMBULATORY MEDICATION CHARGE

## 2025-06-04 ENCOUNTER — PHARMACY VISIT (OUTPATIENT)
Dept: PHARMACY | Facility: CLINIC | Age: 60
End: 2025-06-04
Payer: COMMERCIAL

## 2025-06-04 ENCOUNTER — PATIENT OUTREACH (OUTPATIENT)
Dept: CARE COORDINATION | Facility: CLINIC | Age: 60
End: 2025-06-04

## 2025-06-04 ENCOUNTER — APPOINTMENT (OUTPATIENT)
Dept: PRIMARY CARE | Facility: CLINIC | Age: 60
End: 2025-06-04
Payer: COMMERCIAL

## 2025-06-04 VITALS
DIASTOLIC BLOOD PRESSURE: 74 MMHG | BODY MASS INDEX: 29.4 KG/M2 | HEART RATE: 81 BPM | HEIGHT: 68 IN | WEIGHT: 194 LBS | SYSTOLIC BLOOD PRESSURE: 125 MMHG

## 2025-06-04 DIAGNOSIS — Z00.00 HEALTH CARE MAINTENANCE: Primary | ICD-10-CM

## 2025-06-04 DIAGNOSIS — Z13.6 SCREENING FOR HEART DISEASE: ICD-10-CM

## 2025-06-04 DIAGNOSIS — M62.838 NECK MUSCLE SPASM: ICD-10-CM

## 2025-06-04 DIAGNOSIS — E11.9 TYPE 2 DIABETES MELLITUS WITHOUT COMPLICATION, WITHOUT LONG-TERM CURRENT USE OF INSULIN: ICD-10-CM

## 2025-06-04 PROCEDURE — 3074F SYST BP LT 130 MM HG: CPT | Performed by: INTERNAL MEDICINE

## 2025-06-04 PROCEDURE — 1036F TOBACCO NON-USER: CPT | Performed by: INTERNAL MEDICINE

## 2025-06-04 PROCEDURE — 3078F DIAST BP <80 MM HG: CPT | Performed by: INTERNAL MEDICINE

## 2025-06-04 PROCEDURE — 99396 PREV VISIT EST AGE 40-64: CPT | Performed by: INTERNAL MEDICINE

## 2025-06-04 PROCEDURE — 3008F BODY MASS INDEX DOCD: CPT | Performed by: INTERNAL MEDICINE

## 2025-06-04 PROCEDURE — G8433 SCR FOR DEP NOT CPT DOC RSN: HCPCS | Performed by: INTERNAL MEDICINE

## 2025-06-04 RX ORDER — TIZANIDINE 2 MG/1
2 TABLET ORAL EVERY 6 HOURS PRN
Qty: 30 TABLET | Refills: 0 | Status: SHIPPED | OUTPATIENT
Start: 2025-06-04 | End: 2025-06-14

## 2025-06-04 RX ORDER — METFORMIN HYDROCHLORIDE 1000 MG/1
1000 TABLET ORAL
Qty: 180 TABLET | Refills: 3 | Status: SHIPPED | OUTPATIENT
Start: 2025-06-04

## 2025-06-04 RX ORDER — GLIPIZIDE 10 MG/1
10 TABLET, FILM COATED, EXTENDED RELEASE ORAL DAILY
Qty: 90 TABLET | Refills: 3 | Status: SHIPPED | OUTPATIENT
Start: 2025-06-04

## 2025-06-04 ASSESSMENT — PATIENT HEALTH QUESTIONNAIRE - PHQ9
1. LITTLE INTEREST OR PLEASURE IN DOING THINGS: NOT AT ALL
SUM OF ALL RESPONSES TO PHQ9 QUESTIONS 1 AND 2: 0
2. FEELING DOWN, DEPRESSED OR HOPELESS: NOT AT ALL

## 2025-06-04 NOTE — PROGRESS NOTES
"Subjective   Patient ID: Iraj Benavides is a 59 y.o. male who presents for Follow-up.        HPI     Patient is a 59-year-old male with past medical history of diabetes hypertension and sickle cell trait who presents for wellness.  No complaints at this time.  Been taking his medications as prescribed.  Due for A1c check.  No increased thirst or increased urination.  No significant weight gain.  Lives at home with his wife.  Has 1 child.  Denies illicit substances smoking or alcohol.  No significant exercise.        Review of Systems  Constitutional: No fever or chills, No Night Sweats  Eyes: No Blurry Vision or Eye sight problems  ENT: No Nasal Discharge, Hoarseness, sore throat  Cardiovascular: no chest pain, no palpitations and no syncope.   Respiratory: no cough, no shortness of breath during exertion and no shortness of breath at rest.   Gastrointestinal: no abdominal pain, no nausea and no vomiting.   : No issues with urinary stream, burning with urination, no blood in urine or stools  Skin: No Skin rashes or Lesions  Neuro: No Headache, no dizziness or Numbness or tingling  Psych: No Anxiety, depression or sleeping problems  Heme: No Easy bleeding or brusing.     Objective   /74   Pulse 81   Ht 1.715 m (5' 7.5\")   Wt 88 kg (194 lb)   BMI 29.94 kg/m²     Physical Exam  Constitutional: Alert and in no acute distress. Well developed, well nourished.   Head and Face: Head and face: Normal.    Eyes: Normal external exam. Pupils were equal in size, round, reactive to light (PERRL) with normal accommodation and extraocular movements intact (EOMI).   Ears, Nose, Mouth, and Throat: External inspection of ears and nose: Normal.  Hearing: Normal.  Nasal mucosa, septum, and turbinates: Normal.  Lips, teeth, and gums: Normal.  Oropharynx: Normal.   Neck: No neck mass was observed. Supple. Thyroid not enlarged and there were no palpable thyroid nodules.   Cardiovascular: Heart rate and rhythm were normal, normal " S1 and S2. Pedal pulses: Normal. No peripheral edema.   Pulmonary: No respiratory distress. Clear bilateral breath sounds.   Abdomen: Soft nontender; no abdominal mass palpated. Normal bowel sounds. No organomegaly.   : Deferred  Musculoskeletal: No joint swelling seen, normal movements of all extremities. Range of motion: Normal.  Muscle strength/tone: Normal.    Skin: Normal skin color and pigmentation, normal skin turgor, and no rash.   Neurologic: Deep tendon reflexes were 2+ and symmetric.   Psychiatric: Judgment and insight: Intact. Mood and affect: Normal.  Lymphatic: No cervical lymphadenopathy. Palpation of lymph nodes in axillae: Normal.  Palpation of lymph nodes in groin: Normal.    Lab Results   Component Value Date    WBC 6.7 12/18/2024    HGB 13.8 12/18/2024    HCT 44.5 12/18/2024     12/18/2024    CHOL 165 02/15/2024    TRIG 94 02/15/2024    HDL 43.1 02/15/2024    LDLDIRECT 189 (H) 05/20/2021    ALT 18 06/29/2024    AST 19 06/29/2024     12/18/2024    K 4.3 12/18/2024     12/18/2024    CREATININE 0.92 12/18/2024    BUN 16 12/18/2024    CO2 28 12/18/2024    HGBA1C 6.5 (H) 03/03/2025       CT abdomen pelvis wo IV contrast  Narrative: Interpreted By:  Jorge Luis Newby and Baker Zachary   STUDY:  CT ABDOMEN PELVIS WO IV CONTRAST;  10/29/2023 1:30 pm      INDICATION:  Signs/Symptoms:lower back pain.      COMPARISON:  None.      ACCESSION NUMBER(S):  [id number]      ORDERING CLINICIAN:  LISA OCHOA      TECHNIQUE:  Contiguous axial images of the abdomen and pelvis were obtained  without contrast. Coronal and sagittal reformatted images were  reconstructed from the axial data.      FINDINGS:  LOWER CHEST: Minimal bibasilar atelectasis. Otherwise no acute  abnormality.          ABDOMEN/PELVIS:      ABDOMINAL WALL: No significant abnormality.      LIVER: No significant parenchymal abnormality.      BILE DUCTS: No significant intrahepatic or extrahepatic dilatation.      GALLBLADDER:  No significant abnormality.      PANCREAS: No significant abnormality.      SPLEEN: No significant abnormality.      ADRENALS: No significant abnormality.      KIDNEYS, URETERS, BLADDER: No significant abnormality.      REPRODUCTIVE ORGANS: No significant abnormality.      VESSELS: No significant abnormality.      RETROPERITONEUM/LYMPH NODES: No enlarged lymph nodes. No acute  retroperitoneal abnormality.      BOWEL/MESENTERY/PERITONEUM: No inflammatory bowel wall thickening or  dilatation. Colonic diverticulosis without evidence of  diverticulitis. Normal appendix.      No ascites, free air, or fluid collection.          MUSCULOSKELETAL: No acute osseous abnormality. Benign appearing  fibro-osseous lytic lesion in the left iliac body measuring 2.9 cm is  noted. There is a dense 0.7 cm sclerotic lesion right iliac body  likely representing a bone island. Subchondral cystic change in the  posterior ileal side of the right sacroiliac joint is noted. Moderate  multilevel degenerative changes of the lower lumbar spine, most  pronounced at L5-S1. Notably, there is high-grade bilateral foraminal  stenosis at L5-S1 (right > left). There is also moderate bilateral  L4-L5 foraminal stenosis. There is no high-grade canal stenosis.      Impression: 1. No acute abnormality of the abdomen or pelvis.  2. Moderate multilevel degenerative changes of the lower lumbar  spine, most pronounced at L5-S1, notable for high-grade bilateral  foraminal stenosis (right > left).  3. Colonic diverticulosis without evidence of diverticulitis.          I personally reviewed the images/study and I agree with the findings  as stated. This study was interpreted at [place], [place].      MACRO:  None.      Signed by: Jorge Luis Newby 10/29/2023 2:05 PM  Dictation workstation:   [id number]      Assessment/Plan   Problem List Items Addressed This Visit           ICD-10-CM    Type 2 diabetes mellitus  without complication, without long-term current use of insulin E11.9    Relevant Medications    metFORMIN (Glucophage) 1,000 mg tablet    glipiZIDE XL (Glucotrol XL) 10 mg 24 hr tablet    Other Relevant Orders    Referral to Ophthalmology    Hemoglobin A1c    Albumin-Creatinine Ratio, Urine Random    C-reactive protein    CK    Neck muscle spasm M62.838    Relevant Medications    tiZANidine (Zanaflex) 2 mg tablet    Other Relevant Orders    C-reactive protein    CK     Other Visit Diagnoses         Codes      Health care maintenance    -  Primary Z00.00    Relevant Orders    Prostate Spec.Ag,Screen    CBC    Comprehensive metabolic panel    Lipid Panel    Hemoglobin A1c    Albumin-Creatinine Ratio, Urine Random    TSH with reflex to Free T4 if abnormal    C-reactive protein    CK      Screening for heart disease     Z13.6    Relevant Orders    CT cardiac scoring wo IV contrast              Dear Iraj Benavides     It was my pleasure to take care of you today in the office. Below are the things we discussed today:    1. Immunizations: Yearly Flu shot is recommended.   Pneumonia tetanus vaccine up-to-date    2. Blood Work: Ordered  3. Seen your dentist twice a year  4. Yearly Eye exam is recommended    5. BMI: 29.9  6: Diet recommendations:   Eat Clean, Try to have as many home cooked meals as possible  Avoid processed foods which contain excess calories, sugar, and sodium.    7. Exercise recommendations:   150 minutes a week to maintain your weight     If you have to loose weight, you need a better diet and exercise plan.     8. Please get your Living will / Advance directive completed if you do not have one already. Please make sure our office has a copy of the latest one.     9. Colonoscopy: Uptodat  10. PSA: Ordered    11.  Follow-up 3 months    Follow up in one year for a Physical. Please call the office before your Physical to see if you need blood work completed prior to your physical.     Please call me if any  questions arise from now until your next visit. I will call you after I am done seeing patients. A Doctor is always available by phone when the office is closed. Please feel free to call for help with any problem that you feel shouldn't wait until the office re-opens.

## 2025-06-04 NOTE — PATIENT INSTRUCTIONS
We kindly ask that you take the lead and scheduling your referral appointments to ensure they align best with your availability by calling [phone].  For laboratory tests we encourage you to schedule an appointment online [email or web address] but walk-ins are available as well.  For radiology testing you can call [phone] or [phone] to schedule.  If for any reason you are having difficulty scheduling your appointments please feel free to reach out at our office by calling [phone] to assist further

## 2025-06-05 ENCOUNTER — OFFICE VISIT (OUTPATIENT)
Dept: OPHTHALMOLOGY | Facility: CLINIC | Age: 60
End: 2025-06-05
Payer: COMMERCIAL

## 2025-06-05 DIAGNOSIS — E11.9 TYPE 2 DIABETES MELLITUS WITHOUT COMPLICATION, WITHOUT LONG-TERM CURRENT USE OF INSULIN: Primary | ICD-10-CM

## 2025-06-05 DIAGNOSIS — H25.13 NUCLEAR SCLEROTIC CATARACT OF BOTH EYES: ICD-10-CM

## 2025-06-05 DIAGNOSIS — H52.4 MYOPIA WITH PRESBYOPIA OF BOTH EYES: ICD-10-CM

## 2025-06-05 DIAGNOSIS — H52.13 MYOPIA WITH PRESBYOPIA OF BOTH EYES: ICD-10-CM

## 2025-06-05 PROCEDURE — 92015 DETERMINE REFRACTIVE STATE: CPT | Performed by: OPTOMETRIST

## 2025-06-05 PROCEDURE — 99214 OFFICE O/P EST MOD 30 MIN: CPT | Performed by: OPTOMETRIST

## 2025-06-05 ASSESSMENT — REFRACTION_WEARINGRX
OD_ADD: +2.50
OS_SPHERE: -0.75
OS_CYLINDER: +0.25
OS_ADD: +2.50
OD_CYLINDER: +0.50
OD_AXIS: 101
OD_SPHERE: -1.25
OS_AXIS: 047

## 2025-06-05 ASSESSMENT — VISUAL ACUITY
OS_SC: 20/25
OD_SC: 20/30
OS_SC+: -2
METHOD: SNELLEN - LINEAR
OD_SC+: +2

## 2025-06-05 ASSESSMENT — ENCOUNTER SYMPTOMS
CARDIOVASCULAR NEGATIVE: 0
RESPIRATORY NEGATIVE: 0
GASTROINTESTINAL NEGATIVE: 0
EYES NEGATIVE: 0
CONSTITUTIONAL NEGATIVE: 0
MUSCULOSKELETAL NEGATIVE: 0
HEMATOLOGIC/LYMPHATIC NEGATIVE: 0
PSYCHIATRIC NEGATIVE: 0
ALLERGIC/IMMUNOLOGIC NEGATIVE: 0
ENDOCRINE NEGATIVE: 0
NEUROLOGICAL NEGATIVE: 0

## 2025-06-05 ASSESSMENT — CONF VISUAL FIELD
OS_INFERIOR_TEMPORAL_RESTRICTION: 0
OD_SUPERIOR_TEMPORAL_RESTRICTION: 0
OS_NORMAL: 1
OS_SUPERIOR_NASAL_RESTRICTION: 0
OS_SUPERIOR_TEMPORAL_RESTRICTION: 0
OD_SUPERIOR_NASAL_RESTRICTION: 0
OD_NORMAL: 1
METHOD: COUNTING FINGERS
OS_INFERIOR_NASAL_RESTRICTION: 0
OD_INFERIOR_NASAL_RESTRICTION: 0
OD_INFERIOR_TEMPORAL_RESTRICTION: 0

## 2025-06-05 ASSESSMENT — REFRACTION_MANIFEST
OD_ADD: +2.50
OS_CYLINDER: +0.50
OD_CYLINDER: +0.50
OS_ADD: +2.50
OS_AXIS: 045
OD_SPHERE: -1.25
OD_AXIS: 110
OS_SPHERE: -1.00

## 2025-06-05 ASSESSMENT — REFRACTION
OS_SPHERE: -0.75
OD_AXIS: 110
OS_CYLINDER: +0.50
OD_SPHERE: -1.00
OS_AXIS: 045
OD_ADD: +2.50
OS_ADD: +2.50
OD_CYLINDER: +0.50

## 2025-06-05 ASSESSMENT — CUP TO DISC RATIO
OS_RATIO: .45
OD_RATIO: .35

## 2025-06-05 ASSESSMENT — TONOMETRY
IOP_METHOD: GOLDMANN APPLANATION
OD_IOP_MMHG: 18
OS_IOP_MMHG: 17

## 2025-06-05 ASSESSMENT — EXTERNAL EXAM - RIGHT EYE: OD_EXAM: NORMAL

## 2025-06-05 ASSESSMENT — SLIT LAMP EXAM - LIDS
COMMENTS: GOOD POSITION
COMMENTS: GOOD POSITION

## 2025-06-05 ASSESSMENT — EXTERNAL EXAM - LEFT EYE: OS_EXAM: NORMAL

## 2025-06-05 NOTE — PROGRESS NOTES
Assessment/Plan   Diagnoses and all orders for this visit:  Type 2 diabetes mellitus without complication, without long-term current use of insulin  -     Referral to Ophthalmology  -     Return visit; Future  The patient has diabetes without any evidence of retinopathy.  The patient was advised to maintain tight glucose control, tight blood pressure control, and favorable levels of cholesterol, low density lipoprotein, and high density lipoproteins.  Follow up in one year was recommended.    Myopia with presbyopia of both eyes  New spec rx released today per patient request. Ocular health wnl for age OU. Monitor 1 year or sooner prn. Refraction billed today. Pt consents to receiving glasses Rx today. Patient's/guardian's signature obtained to acknowledge and confirm that a paper copy of glasses Rx was given to patient in compliance with [facility] Eyeglass Rule. Electronic copy of Rx will also be available via [employer].    Ok to wear glasses just for reading. Pt req nvo glasses.     Nuclear sclerotic cataract of both eyes  Patient's cataracts are not visually significant. Will monitor for changes. No indication for surgery at this time.     yes

## 2025-06-06 LAB
ALBUMIN SERPL-MCNC: 4.4 G/DL (ref 3.6–5.1)
ALBUMIN/CREAT UR: NORMAL MG/G CREAT
ALP SERPL-CCNC: 44 U/L (ref 35–144)
ALT SERPL-CCNC: 16 U/L (ref 9–46)
ANION GAP SERPL CALCULATED.4IONS-SCNC: 8 MMOL/L (CALC) (ref 7–17)
AST SERPL-CCNC: 16 U/L (ref 10–35)
BILIRUB SERPL-MCNC: 0.6 MG/DL (ref 0.2–1.2)
BUN SERPL-MCNC: 17 MG/DL (ref 7–25)
CALCIUM SERPL-MCNC: 9.6 MG/DL (ref 8.6–10.3)
CHLORIDE SERPL-SCNC: 103 MMOL/L (ref 98–110)
CHOLEST SERPL-MCNC: 253 MG/DL
CHOLEST/HDLC SERPL: 4.9 (CALC)
CK SERPL-CCNC: 176 U/L (ref 22–308)
CO2 SERPL-SCNC: 27 MMOL/L (ref 20–32)
CREAT SERPL-MCNC: 1.01 MG/DL (ref 0.7–1.35)
CREAT UR-MCNC: 192 MG/DL (ref 20–320)
CRP SERPL-MCNC: <3 MG/L
EGFRCR SERPLBLD CKD-EPI 2021: 85 ML/MIN/1.73M2
ERYTHROCYTE [DISTWIDTH] IN BLOOD BY AUTOMATED COUNT: 12.6 % (ref 11–15)
EST. AVERAGE GLUCOSE BLD GHB EST-MCNC: 154 MG/DL
EST. AVERAGE GLUCOSE BLD GHB EST-SCNC: 8.5 MMOL/L
GLUCOSE SERPL-MCNC: 142 MG/DL (ref 65–99)
HBA1C MFR BLD: 7 %
HCT VFR BLD AUTO: 46.1 % (ref 38.5–50)
HDLC SERPL-MCNC: 52 MG/DL
HGB BLD-MCNC: 14.4 G/DL (ref 13.2–17.1)
LDLC SERPL CALC-MCNC: 177 MG/DL (CALC)
MCH RBC QN AUTO: 27.6 PG (ref 27–33)
MCHC RBC AUTO-ENTMCNC: 31.2 G/DL (ref 32–36)
MCV RBC AUTO: 88.3 FL (ref 80–100)
MICROALBUMIN UR-MCNC: <0.2 MG/DL
NONHDLC SERPL-MCNC: 201 MG/DL (CALC)
PLATELET # BLD AUTO: 237 THOUSAND/UL (ref 140–400)
PMV BLD REES-ECKER: 11 FL (ref 7.5–12.5)
POTASSIUM SERPL-SCNC: 4.2 MMOL/L (ref 3.5–5.3)
PROT SERPL-MCNC: 7 G/DL (ref 6.1–8.1)
PSA SERPL-MCNC: 0.5 NG/ML
RBC # BLD AUTO: 5.22 MILLION/UL (ref 4.2–5.8)
SODIUM SERPL-SCNC: 138 MMOL/L (ref 135–146)
TRIGL SERPL-MCNC: 111 MG/DL
TSH SERPL-ACNC: 2.27 MIU/L (ref 0.4–4.5)
WBC # BLD AUTO: 5.9 THOUSAND/UL (ref 3.8–10.8)

## 2025-06-11 ENCOUNTER — APPOINTMENT (OUTPATIENT)
Dept: PHARMACY | Facility: HOSPITAL | Age: 60
End: 2025-06-11
Payer: COMMERCIAL

## 2025-06-12 DIAGNOSIS — E78.5 HYPERLIPIDEMIA, UNSPECIFIED HYPERLIPIDEMIA TYPE: Primary | ICD-10-CM

## 2025-06-19 RX ORDER — ATORVASTATIN CALCIUM 20 MG/1
20 TABLET, FILM COATED ORAL DAILY
Qty: 100 TABLET | Refills: 3 | Status: SHIPPED | OUTPATIENT
Start: 2025-06-19 | End: 2026-07-24

## 2025-07-09 PROCEDURE — RXMED WILLOW AMBULATORY MEDICATION CHARGE

## 2025-07-11 ENCOUNTER — PHARMACY VISIT (OUTPATIENT)
Dept: PHARMACY | Facility: CLINIC | Age: 60
End: 2025-07-11
Payer: MEDICARE

## 2025-07-16 ENCOUNTER — APPOINTMENT (OUTPATIENT)
Dept: PHARMACY | Facility: HOSPITAL | Age: 60
End: 2025-07-16
Payer: COMMERCIAL

## 2025-07-16 DIAGNOSIS — E11.9 TYPE 2 DIABETES MELLITUS WITHOUT COMPLICATION, WITHOUT LONG-TERM CURRENT USE OF INSULIN: Primary | ICD-10-CM

## 2025-07-16 NOTE — ASSESSMENT & PLAN NOTE
Patient's goal A1c is < 7%.  Is pt at goal? Yes, 7%  Patient's SMBGs are mildly elevated with -140 per patient report.     Rationale for plan: Discussed importance of medication adherence and lifestyle changes. Has been missing doses of glipizide.    Medication Changes:  CONTINUE  Trulicity 4.5 mg/0.5 mL - once weekly (Mondays)  Metformin 1000 mg - 1 tablet twice daily  Glipizide ER 10 mg - 1 tablet daily   Patient to switch from taking at bedtime to taking in the morning prior to breakfast    Monitoring and Education:  Patient was instructed to start Atorvastatin for elevated LDL   Patient to schedule with nutritionist   Continue testing FBG

## 2025-07-16 NOTE — PROGRESS NOTES
"  Clinical Pharmacy Appointment  Subjective     Patient ID: Iraj Benavides is a 59 y.o. male who presents for Diabetes.    Referring Provider: Jairon Evans DO   Last visit: 6/4/25  Next visit: 9/4/25    DIABETES MELLITUS TYPE 2:    Does patient follow with Endocrinology: No     Exercise: active at work - walking      Diet: referred to nutritionist by PCP - \"fell off my diet\"   `   Weight:   188 lbs 3/3/25 - office (up from previously reported home weight of 176 lbs)    Allergies   Allergen Reactions    Penicillins Unknown       Objective     Current DM Pharmacotherapy:   Trulicity 4.5 mg/0.5 mL - once weekly (Mondays)  Metformin 1000 mg - 1 tablet twice daily  Glipizide ER 10 mg - 1 tablet daily     Clarifications to above regimen: None  Adverse Effects: None     Previous DM Pharmacotherapy:   Farxiga - UTI   Rybelsus- UTI and feels this was the cause     SECONDARY PREVENTION  - Statin? No, myalgias on Crestor - has not started Atorvastatin   - ACEi/ARB? No  - Aspirin? Yes    Current monitoring regimen:   Patient is using: glucometer    Testing frequency: daily     SMBG Readings: 130-140 mg/dL (FBG)    Any episodes of hypoglycemia? No.  Did patient treat episode of hypoglycemia appropriately? N/A    Pertinent PMH Review:  - PMH of Pancreatitis: No  - PMH/FH of Medullary Thyroid Cancer: No  - PMH/FH of Multiple Endocrine Neoplasia (MEN) Type II: No  - PMH of Retinopathy: No  - PMH of Urinary Tract Infections/Yeast Infections: Yes    Lab Review  BMP  Lab Results   Component Value Date    CALCIUM 9.6 06/05/2025     06/05/2025    K 4.2 06/05/2025    CO2 27 06/05/2025     06/05/2025    BUN 17 06/05/2025    CREATININE 1.01 06/05/2025    EGFR 85 06/05/2025     LFTs  Lab Results   Component Value Date    ALT 16 06/05/2025    AST 16 06/05/2025    ALKPHOS 44 06/05/2025    BILITOT 0.6 06/05/2025     FLP  Lab Results   Component Value Date    TRIG 111 06/05/2025    CHOL 253 (H) 06/05/2025    LDLF 134 (H) 03/24/2023 "    LDLCALC 177 (H) 06/05/2025    HDL 52 06/05/2025       The 10-year ASCVD risk score (Salena FERNANDEZ, et al., 2019) is: 15.3%    Values used to calculate the score:      Age: 59 years      Clincally relevant sex: Male      Is Non- : Yes      Diabetic: Yes      Tobacco smoker: No      Systolic Blood Pressure: 125 mmHg      Is BP treated: No      HDL Cholesterol: 52 mg/dL      Total Cholesterol: 253 mg/dL  Urine Microalbumin  Lab Results   Component Value Date    MICROALBCREA NOTE 06/05/2025     Weight Management  Wt Readings from Last 3 Encounters:   06/04/25 88 kg (194 lb)   03/03/25 85.3 kg (188 lb)   11/07/24 78.5 kg (173 lb)      There is no height or weight on file to calculate BMI.   A1C  Lab Results   Component Value Date    HGBA1C 7.0 (H) 06/05/2025    HGBA1C 6.5 (H) 03/03/2025    HGBA1C 9.4 (H) 12/18/2024     Assessment/Plan     Problem List Items Addressed This Visit       Type 2 diabetes mellitus without complication, without long-term current use of insulin - Primary    Patient's goal A1c is < 7%.  Is pt at goal? Yes, 7%  Patient's SMBGs are mildly elevated with -140 per patient report.     Rationale for plan: Discussed importance of medication adherence and lifestyle changes. Has been missing doses of glipizide.    Medication Changes:  CONTINUE  Trulicity 4.5 mg/0.5 mL - once weekly (Mondays)  Metformin 1000 mg - 1 tablet twice daily  Glipizide ER 10 mg - 1 tablet daily   Patient to switch from taking at bedtime to taking in the morning prior to breakfast    Monitoring and Education:  Patient was instructed to start Atorvastatin for elevated LDL   Patient to schedule with nutritionist   Continue testing FBG               Follow up: I recommend diabetes care be 12 weeks.    Marla Martinez PharmD, BCACP  Clinical Pharmacy Specialist, Primary Care       Continue all meds under the continuation of care with the referring provider and clinical pharmacy team

## 2025-08-07 PROCEDURE — RXMED WILLOW AMBULATORY MEDICATION CHARGE

## 2025-08-12 ENCOUNTER — PHARMACY VISIT (OUTPATIENT)
Dept: PHARMACY | Facility: CLINIC | Age: 60
End: 2025-08-12
Payer: MEDICARE

## 2025-09-04 ENCOUNTER — APPOINTMENT (OUTPATIENT)
Dept: PRIMARY CARE | Facility: CLINIC | Age: 60
End: 2025-09-04
Payer: COMMERCIAL

## 2025-10-08 ENCOUNTER — APPOINTMENT (OUTPATIENT)
Dept: PHARMACY | Facility: HOSPITAL | Age: 60
End: 2025-10-08
Payer: COMMERCIAL

## 2026-06-05 ENCOUNTER — APPOINTMENT (OUTPATIENT)
Dept: OPHTHALMOLOGY | Facility: CLINIC | Age: 61
End: 2026-06-05
Payer: COMMERCIAL